# Patient Record
Sex: MALE | Race: WHITE | Employment: OTHER | URBAN - METROPOLITAN AREA
[De-identification: names, ages, dates, MRNs, and addresses within clinical notes are randomized per-mention and may not be internally consistent; named-entity substitution may affect disease eponyms.]

---

## 2022-03-18 PROBLEM — M54.17 LUMBOSACRAL NEURITIS: Status: ACTIVE | Noted: 2020-02-22

## 2022-03-19 PROBLEM — M51.379 DEGENERATION OF LUMBAR OR LUMBOSACRAL INTERVERTEBRAL DISC: Status: ACTIVE | Noted: 2020-02-22

## 2022-03-19 PROBLEM — M51.37 DEGENERATION OF LUMBAR OR LUMBOSACRAL INTERVERTEBRAL DISC: Status: ACTIVE | Noted: 2020-02-22

## 2022-03-20 PROBLEM — M43.19 SPONDYLOLISTHESIS OF MULTIPLE SITES IN SPINE: Status: ACTIVE | Noted: 2020-02-22

## 2022-05-23 ENCOUNTER — OFFICE VISIT (OUTPATIENT)
Dept: ORTHOPEDIC SURGERY | Age: 72
End: 2022-05-23
Payer: MEDICARE

## 2022-05-23 VITALS — HEIGHT: 68 IN | BODY MASS INDEX: 31.07 KG/M2 | WEIGHT: 205 LBS

## 2022-05-23 DIAGNOSIS — M54.16 LUMBAR RADICULOPATHY: Primary | ICD-10-CM

## 2022-05-23 DIAGNOSIS — M43.10 DEGENERATIVE SPONDYLOLISTHESIS: ICD-10-CM

## 2022-05-23 DIAGNOSIS — M48.062 SPINAL STENOSIS OF LUMBAR REGION WITH NEUROGENIC CLAUDICATION: ICD-10-CM

## 2022-05-23 DIAGNOSIS — M41.57 OTHER SECONDARY SCOLIOSIS, LUMBOSACRAL REGION: ICD-10-CM

## 2022-05-23 PROCEDURE — G8428 CUR MEDS NOT DOCUMENT: HCPCS | Performed by: ORTHOPAEDIC SURGERY

## 2022-05-23 PROCEDURE — G8417 CALC BMI ABV UP PARAM F/U: HCPCS | Performed by: ORTHOPAEDIC SURGERY

## 2022-05-23 PROCEDURE — 1036F TOBACCO NON-USER: CPT | Performed by: ORTHOPAEDIC SURGERY

## 2022-05-23 PROCEDURE — 1123F ACP DISCUSS/DSCN MKR DOCD: CPT | Performed by: ORTHOPAEDIC SURGERY

## 2022-05-23 PROCEDURE — 3017F COLORECTAL CA SCREEN DOC REV: CPT | Performed by: ORTHOPAEDIC SURGERY

## 2022-05-23 PROCEDURE — 99214 OFFICE O/P EST MOD 30 MIN: CPT | Performed by: ORTHOPAEDIC SURGERY

## 2022-05-23 NOTE — PROGRESS NOTES
10 MG tablet, Take one tablet by mouth 1 hour prior to procedure. (Patient not taking: Reported on 5/23/2022), Disp: , Rfl:     lansoprazole (PREVACID) 15 MG delayed release capsule, Take 15 mg by mouth every morning (before breakfast) (Patient not taking: Reported on 5/23/2022), Disp: , Rfl:     Allergies: Allergies   Allergen Reactions    Hydrocodone-Acetaminophen Nausea And Vomiting    Tramadol Nausea And Vomiting         Physical Exam:     This is a well developed well nourished male adult in no acute distress. Mood and affect are appropriate. Oriented to person, place, and time. Respirations are unlabored and there is no evidence of cyanosis    Chest is clear to auscultation. Heart is regular rate and rhythm. Inspection of the back reveals no evidence of rash, such as zoster. Examination of the lumbar spine reveals no significantly imbalanced sagittal or coronal plane deformity. Flexion is limited by pain more so than extension. There is not significant tenderness to palpation along the spinous processes or paraspinal musculature. The patient ambulates with a mildly antalgic gait and crouched posture. Straight leg testing is negative. There is minimal hip irritability with internal or external rotation bilaterally. Sensory testing reveals intact sensation to light touch and pin prick in the distribution of the L3-S1 dermatomes bilaterally, though there is subjective tingling over the buttocks and posterior thighs. .    Reflexes   Right Left   Quadriceps (L4) 2 2   Achilles (S1) 2 2     Ankle jerk is negative for clonus    Strength testing in the lower extremity reveals the following based on the 5 point grading scale:     HF (L2) H Ab (L5) KE (L3/4) ADF (L4) EHL (L5) A Ev (S1) APF (S1)   Right 5 5 5 5 5 5 5   Left 5 5 5 5 5 5 5     The feet are warm with good capillary refill and palpable pedal pulses.      Radiographic Studies:     X-rays including AP and lateral views of the lumbar spine were reviewed and interpreted: There is noted to be a L4-L5 spondylolisthesis. Also, there are multiple spondylotic changes noted including loss of disk height and osteophytes. No destructive lesion. Bone quality appears normal.   There is also noted to be scoliosis emanating from the L4-L5 and L5-S1 asymmetrical disc collapse. Radiographic Impression:    Lumbar spondylolisthesis    MRI of the lumbar spine images were reviewed and interpreted: Post laminectomy changes are noted at L4-L5. There are gaping facets. Also, there is noted to be lateral recess stenosis and asymmetrical loss of disc height at L5-S1. Diagnosis:      ICD-10-CM    1. Lumbar radiculopathy  M54.16    2. Degenerative spondylolisthesis  M43.10    3. Spinal stenosis of lumbar region with neurogenic claudication  M48.062    4. Other secondary scoliosis, lumbosacral region  M41.57        Assessment/Plan: This patient's clinical history and physical exam is consistent with neurogenic claudication which is likely due to lumbar stenosis with spondylolisthesis. I discussed the natural history of lumbar stenosis in that it is a degenerative condition that is usually slowly progressive resulting in gradual loss of mobility. I reassured the patient that this is not a condition that typically predisposes them to an acute paraplegia; however, the more neurologic deficits they acquire and the longer they go untreated, the less likely they are to have neurological improvements after an operation. They understand that conservative treatments typically include physical therapy, oral and/or epidural steroids, pain management, and simple observation. And, that these treatments do not address the anatomical pinching of the spinal nerves, but rather help patients cope with the resulting symptoms.   I also discussed potential surgical if the symptoms fail to improve or there is a progressive neurologic deficit or conservative management has been exhausted.    -Lumbar TLIF: We discussed the details of surgery including a midline incision in over the low back followed by dissection to the area of stenosis. The nerves would be freed up by trimming any impinging structures including ligaments and bone. Then any segments that are deemed to be unstable will be fused together with cadaver bone and screws and rods will supplement the fusion. A drain may be inserted and the wound would be closed with suture and covered with sterile dressings. The patient would expect to stay in the hospital 1-2 days or until he can get about safely with minimal assistance. A short stay in a rehabilitation facility could also be considered depending on how quickly he recovers. Follow-up would be scheduled for 2-3 weeks and he would have restrictions including no driving, and no lifting greater than 15 lbs until follow up with me. He was encouraged to walk as much as possible before and after the operation to facilitate an expeditious recovery. We also discussed the potential risks of the surgery including, but not limited to infection, spinal fluid leak and potential headaches requiring him to remain supine or have a lumbar drain inserted post-operatively; injury to the cauda equina or peripheral nerve root resulting in paralysis, bowel or bladder dysfunction, or loss of use of an extremity; persistent back or leg symptoms or pain at the bone aspirate/graft site; recurrence of stenosis or the development of instability, or failure of the hardware or fusion to heal possibly needing additional surgery;  blood loss requiring transfusion; and the risks of anesthesia including, but not limited heart attack, stroke, and blood clot. The patient voiced an understanding of these issues as outlined.   The procedure that may prove to be beneficial here is a L4-S1 laminectomy and fusion with allograft, and instrumentation, transforaminal lumbar interbody fusion.         Electronically Signed By Caitlin Salmeron MD     2:40 PM

## 2022-05-25 ENCOUNTER — TELEPHONE (OUTPATIENT)
Dept: ORTHOPEDIC SURGERY | Age: 72
End: 2022-05-25

## 2022-05-25 NOTE — TELEPHONE ENCOUNTER
Tried to call patient could not leave a voice mail. Pre op for surgery is usually one week prior to surgery.

## 2022-05-25 NOTE — TELEPHONE ENCOUNTER
Patient is scheduled for surgery 9/7 and they are trying to work out some plans and would like to know when the preop would be

## 2022-08-01 ENCOUNTER — TELEPHONE (OUTPATIENT)
Dept: ORTHOPEDIC SURGERY | Age: 72
End: 2022-08-01

## 2022-08-01 DIAGNOSIS — M54.16 LUMBAR RADICULOPATHY: Primary | ICD-10-CM

## 2022-08-01 DIAGNOSIS — M43.10 DEGENERATIVE SPONDYLOLISTHESIS: ICD-10-CM

## 2022-08-01 DIAGNOSIS — M48.062 SPINAL STENOSIS OF LUMBAR REGION WITH NEUROGENIC CLAUDICATION: ICD-10-CM

## 2022-08-01 NOTE — TELEPHONE ENCOUNTER
Spoke with patient and he would like to schedule another injection with Dr. Marquetta Burkitt. Appointment was scheduled today.

## 2022-08-02 ENCOUNTER — OFFICE VISIT (OUTPATIENT)
Dept: ORTHOPEDIC SURGERY | Age: 72
End: 2022-08-02
Payer: MEDICARE

## 2022-08-02 ENCOUNTER — PREP FOR PROCEDURE (OUTPATIENT)
Dept: ORTHOPEDIC SURGERY | Age: 72
End: 2022-08-02

## 2022-08-02 DIAGNOSIS — M48.062 SPINAL STENOSIS OF LUMBAR REGION WITH NEUROGENIC CLAUDICATION: ICD-10-CM

## 2022-08-02 DIAGNOSIS — M54.16 LUMBAR RADICULOPATHY: Primary | ICD-10-CM

## 2022-08-02 DIAGNOSIS — M43.10 DEGENERATIVE SPONDYLOLISTHESIS: ICD-10-CM

## 2022-08-02 DIAGNOSIS — M41.57 OTHER SECONDARY SCOLIOSIS, LUMBOSACRAL REGION: ICD-10-CM

## 2022-08-02 PROCEDURE — 64483 NJX AA&/STRD TFRM EPI L/S 1: CPT | Performed by: PHYSICAL MEDICINE & REHABILITATION

## 2022-08-02 RX ORDER — TRIAMCINOLONE ACETONIDE 40 MG/ML
120 INJECTION, SUSPENSION INTRA-ARTICULAR; INTRAMUSCULAR ONCE
Status: COMPLETED | OUTPATIENT
Start: 2022-08-02 | End: 2022-08-02

## 2022-08-02 RX ADMIN — TRIAMCINOLONE ACETONIDE 120 MG: 40 INJECTION, SUSPENSION INTRA-ARTICULAR; INTRAMUSCULAR at 16:34

## 2022-08-02 NOTE — PROGRESS NOTES
Name: Thelma Howard  YOB: 1950  Gender: male  MRN: [id number]        Transforaminal ZIGGY Procedure Note    Procedure: Bilateral  L4-L5 transforaminal epidural steroid injections     Precautions: Thelma Howard denies prior sensitivity to steroid, local anesthetic, iodine, or shellfish. Consent:  Consent was obtained prior to the procedure. The procedure was discussed at length with Thelma Howard. He was given the opportunity to ask questions regarding the procedure and its associated risks. In addition to the potential risks associated with the procedure itself, the patient was informed both verbally and in writing of potential side effects of the use glucocorticoids. The patient appeared to comprehend the informed consent and desired to have the procedure performed, and informed consent was signed. He was placed in a prone position on the fluoroscopy table and the skin was prepped and draped in a routine sterile fashion. The areas to be injected were each anesthetized with 1 ml of 1% Lidocaine. A 22 gauge 3.5 inch spinal needle was carefully advanced under fluoroscopic guidance to the right L4-L5 transforaminal space  0.5 ml of 70% of Omnipaque was injected to confirm proper needle placement and absence of subdural or vascular flow Once proper placement was confirmed, 0.5ml of 0.25 marcaine and 60 mg of kenalog were injected through the spinal needle. The above procedure was then repeated at the Left  L4-L5 transforaminal space using 60 mg of kenalog. Fluoroscopic guidance was used intermittently over a 10-minute period to insure proper needle placement and his safety. A hard copy of the fluoroscopic image has been placed in his chart and is saved on the C-arm hard drive. He was monitored for 30 minutes after the procedure and discharged home in a stable fashion with a routine follow up.     Procedural Diagnosis:     ICD-10-CM    1. Lumbar radiculopathy

## 2022-08-31 ENCOUNTER — HOSPITAL ENCOUNTER (OUTPATIENT)
Dept: PREADMISSION TESTING | Age: 72
Discharge: HOME | End: 2022-09-03
Payer: MEDICARE

## 2022-08-31 VITALS
RESPIRATION RATE: 16 BRPM | HEART RATE: 68 BPM | TEMPERATURE: 97.7 F | DIASTOLIC BLOOD PRESSURE: 74 MMHG | WEIGHT: 205 LBS | HEIGHT: 68 IN | BODY MASS INDEX: 31.07 KG/M2 | SYSTOLIC BLOOD PRESSURE: 130 MMHG | OXYGEN SATURATION: 97 %

## 2022-08-31 LAB
ANION GAP SERPL CALC-SCNC: 9 MMOL/L (ref 4–13)
APPEARANCE UR: CLEAR
BACTERIA SPEC CULT: NORMAL
BACTERIA URNS QL MICRO: NEGATIVE /HPF
BASOPHILS # BLD: 0 K/UL (ref 0–0.2)
BASOPHILS NFR BLD: 1 % (ref 0–2)
BILIRUB UR QL: NEGATIVE
BUN SERPL-MCNC: 23 MG/DL (ref 8–23)
CALCIUM SERPL-MCNC: 8.9 MG/DL (ref 8.3–10.4)
CASTS URNS QL MICRO: ABNORMAL /LPF
CHLORIDE SERPL-SCNC: 103 MMOL/L (ref 101–110)
CO2 SERPL-SCNC: 30 MMOL/L (ref 21–32)
COLOR UR: ABNORMAL
CREAT SERPL-MCNC: 1.16 MG/DL (ref 0.8–1.5)
DIFFERENTIAL METHOD BLD: ABNORMAL
EOSINOPHIL # BLD: 0.2 K/UL (ref 0–0.8)
EOSINOPHIL NFR BLD: 3 % (ref 0.5–7.8)
EPI CELLS #/AREA URNS HPF: ABNORMAL /HPF
ERYTHROCYTE [DISTWIDTH] IN BLOOD BY AUTOMATED COUNT: 13.3 % (ref 11.9–14.6)
GLUCOSE SERPL-MCNC: 87 MG/DL (ref 65–100)
GLUCOSE UR STRIP.AUTO-MCNC: NEGATIVE MG/DL
HCT VFR BLD AUTO: 34 % (ref 41.1–50.3)
HGB BLD-MCNC: 11.6 G/DL (ref 13.6–17.2)
HGB UR QL STRIP: NEGATIVE
IMM GRANULOCYTES # BLD AUTO: 0.1 K/UL (ref 0–0.5)
IMM GRANULOCYTES NFR BLD AUTO: 1 % (ref 0–5)
KETONES UR QL STRIP.AUTO: NEGATIVE MG/DL
LEUKOCYTE ESTERASE UR QL STRIP.AUTO: NEGATIVE
LYMPHOCYTES # BLD: 1.8 K/UL (ref 0.5–4.6)
LYMPHOCYTES NFR BLD: 29 % (ref 13–44)
MCH RBC QN AUTO: 32 PG (ref 26.1–32.9)
MCHC RBC AUTO-ENTMCNC: 34.1 G/DL (ref 31.4–35)
MCV RBC AUTO: 93.7 FL (ref 79.6–97.8)
MONOCYTES # BLD: 0.6 K/UL (ref 0.1–1.3)
MONOCYTES NFR BLD: 10 % (ref 4–12)
NEUTS SEG # BLD: 3.5 K/UL (ref 1.7–8.2)
NEUTS SEG NFR BLD: 57 % (ref 43–78)
NITRITE UR QL STRIP.AUTO: NEGATIVE
NRBC # BLD: 0 K/UL (ref 0–0.2)
PH UR STRIP: 7.5 [PH] (ref 5–9)
PLATELET # BLD AUTO: 178 K/UL (ref 150–450)
PMV BLD AUTO: 10.5 FL (ref 9.4–12.3)
POTASSIUM SERPL-SCNC: 3 MMOL/L (ref 3.5–5.1)
PROT UR STRIP-MCNC: ABNORMAL MG/DL
RBC # BLD AUTO: 3.63 M/UL (ref 4.23–5.6)
RBC #/AREA URNS HPF: ABNORMAL /HPF
SERVICE CMNT-IMP: NORMAL
SODIUM SERPL-SCNC: 142 MMOL/L (ref 136–145)
SP GR UR REFRACTOMETRY: 1.02 (ref 1–1.02)
UROBILINOGEN UR QL STRIP.AUTO: 0.2 EU/DL (ref 0.2–1)
WBC # BLD AUTO: 6.2 K/UL (ref 4.3–11.1)
WBC URNS QL MICRO: ABNORMAL /HPF

## 2022-08-31 PROCEDURE — 87641 MR-STAPH DNA AMP PROBE: CPT

## 2022-08-31 PROCEDURE — 80048 BASIC METABOLIC PNL TOTAL CA: CPT

## 2022-08-31 PROCEDURE — 85025 COMPLETE CBC W/AUTO DIFF WBC: CPT

## 2022-08-31 PROCEDURE — 81001 URINALYSIS AUTO W/SCOPE: CPT

## 2022-08-31 RX ORDER — LOSARTAN POTASSIUM AND HYDROCHLOROTHIAZIDE 25; 100 MG/1; MG/1
1 TABLET ORAL DAILY
COMMUNITY

## 2022-08-31 RX ORDER — ATORVASTATIN CALCIUM 80 MG/1
80 TABLET, FILM COATED ORAL NIGHTLY
COMMUNITY

## 2022-08-31 RX ORDER — CHLORAL HYDRATE 500 MG
1000 CAPSULE ORAL DAILY
COMMUNITY

## 2022-08-31 NOTE — PERIOP NOTE
Patient verified name, , and surgery as listed in [facility]. Patient provided medical/health information and PTA medications to the best of their ability. TYPE  CASE: 3  Orders per surgeon: Orders not received  Labs per surgeon: CBC with diff, BMP, UA, MRSA/MSSA. Results: pending. Labs per anesthesia protocol: T/S DOS  EKG:  NA    Pt instructed to hold Aspirin- pt denies any heart stents, MI, CVA,  or DVT. MRSA/MSSA swab collected; pharmacy to review and dose antibiotic as appropriate. Patient provided with and instructed on education handouts including Guide to Surgery, blood transfusions, pain management, and hand hygiene for the family and community, and [facility] [place]. Road to Recovery Spine surgery patient guide given. Instructed on incentive spirometry with return demonstration. Patient viewed spine prehab video. PASTORA-HEX and instructions given per hospital policy. Pt instructed to apply  [facility]- HEX to small portion of skin, like hand to determine allergic reaction. Original medication prescription bottles were visualized during patient appointment. Patient teach back successful and patient demonstrates knowledge of instruction.

## 2022-08-31 NOTE — PERIOP NOTE
PLEASE CONTINUE TAKING ALL PRESCRIPTION MEDICATIONS UP TO THE DAY OF SURGERY UNLESS OTHERWISE DIRECTED BELOW. DISCONTINUE all vitamins and supplements 7 days prior to surgery. DISCONTINUE Non-Steriodal Anti-Inflammatory (NSAIDS) such as Advil and Aleve 5 days prior to surgery. Home Medications to take  the day of surgery     Amlodipine (Norvasc)     Omeprazole (Prilosec)     Sertraline (Zoloft)     Home Medications   to Hold   No Vitamins, Supplements, or Anti- Inflammatories such as Aleve, Ibuprofen, Excedrin, Motrin, or Advil. Aspirin 81mg  Diclofenac (Voltaren)  Fish Oil  Multivitamin     Comments       On the day before surgery please take Acetaminophen 1000mg in the morning and then again before bed. You may substitute for Tylenol 650 mg.      Bring CPAP machine the day of surgery. Please do not bring home medications with you on the day of surgery unless otherwise directed by your nurse. If you are instructed to bring home medications, please give them to your nurse as they will be administered by the nursing staff. If you have any questions, please call [facility] [phone] or [address] [place] [address] [phone]. A copy of this note was provided to the patient for reference. How to Use Your Incentive Spirometer       About Your Incentive Spirometer  An incentive spirometer is a device that will expand your lungs by helping you to breathe more deeply and fully. The parts of your incentive spirometer are labeled in Figure 1. Using your incentive spirometer  When you're using your incentive spirometer, make sure to breathe through your mouth. If you breathe through your nose, the incentive spirometer won't work properly. You can hold your nose if you have trouble. DO NOT BLOW INTO THE DEVICE. If you feel dizzy at any time, stop and rest. Try again at a later time. Sit upright in a chair or in bed. Hold the incentive spirometer at eye level.    Put the mouthpiece in your mouth and close your lips tightly around it. Slowly breathe out (exhale) completely. Breathe in (inhale) slowly through your mouth as deeply as you can. As you take the breath, you will see the piston rise inside the large column. While the piston rises, the indicator on the right should move upwards. It should stay in between the 2 arrows (see Figure 1). Try to get the piston as high as you can, while keeping the indicator between the arrows. If the indicator doesn't stay between the arrows, you're breathing either too fast or too slow. When you get it as high as you can, hold your breath for 10 seconds, or as long as possible. While you're holding your breath, the piston will slowly fall to the base of the spirometer. Once the piston reaches the bottom of the spirometer, breathe out slowly through your mouth. Rest for a few seconds. Repeat 10 times. Try to get the piston to the same level with each breath. After each set of 10 breaths, try to cough as coughing will help loosen or clear any mucus in your lungs. Put the marker at the level the piston reached on your incentive spirometer. This will be your goal next time. Repeat these steps every hour that you're awake.   Cover the mouthpiece of the incentive spirometer when you aren't using it

## 2022-08-31 NOTE — PERIOP NOTE
Latest Reference Range & Units 8/31/22 10:30   Sodium 136 - 145 mmol/L 142   Potassium 3.5 - 5.1 mmol/L 3.0 (L)   Chloride 101 - 110 mmol/L 103   CO2 21 - 32 mmol/L 30   BUN,BUNPL 8 - 23 MG/DL 23   Creatinine 0.8 - 1.5 MG/DL 1.16   Anion Gap 4 - 13 mmol/L 9   GFR Non-African American >60 ml/min/1.73m2 >60   GFR  >60 ml/min/1.73m2 >60   Glucose, Random 65 - 100 mg/dL 87   CALCIUM, SERUM, 675440 8.3 - 10.4 MG/DL 8.9   WBC 4.3 - 11.1 K/uL 6.2   RBC 4.23 - 5.6 M/uL 3.63 (L)   Hemoglobin Quant 13.6 - 17.2 g/dL 11.6 (L)   Hematocrit 41.1 - 50.3 % 34.0 (L)   MCV 79.6 - 97.8 FL 93.7   MCH 26.1 - 32.9 PG 32.0   MCHC 31.4 - 35.0 g/dL 34.1   MPV 9.4 - 12.3 FL 10.5   RDW 11.9 - 14.6 % 13.3   Platelet Count 410 - 450 K/uL 178   Absolute Mono # 0.1 - 1.3 K/UL 0.6   Eosinophils % 0.5 - 7.8 % 3   Basophils Absolute 0.0 - 0.2 K/UL 0.0   Differential Type -   AUTOMATED   Seg Neutrophils 43 - 78 % 57   Segs Absolute 1.7 - 8.2 K/UL 3.5   Lymphocytes 13 - 44 % 29   Absolute Lymph # 0.5 - 4.6 K/UL 1.8   Monocytes 4.0 - 12.0 % 10   Absolute Eos # 0.0 - 0.8 K/UL 0.2   Basophils 0.0 - 2.0 % 1   Immature Granulocytes 0.0 - 5.0 % 1   Nucleated Red Blood Cells 0.0 - 0.2 K/uL 0.00   Absolute Immature Granulocyte 0.0 - 0.5 K/UL 0.1   Color, UA -   YELLOW/STRAW   Glucose, UA mg/dL Negative   Bilirubin, Urine NEG   Negative   Ketones, Urine NEG mg/dL Negative   Specific Gravity, UA 1.001 - 1.023   1.021   Blood, Urine NEG   Negative   Protein, UA NEG mg/dL TRACE !    Urobilinogen, Urine 0.2 - 1.0 EU/dL 0.2   Nitrite, Urine NEG   Negative   Leukocyte Esterase, Urine NEG   Negative   Appearance -   CLEAR   pH, Urine 5.0 - 9.0   7.5   Casts 0 /lpf 0-2   WBC, UA 0 /hpf 0-4   RBC, UA 0 /hpf 0-5   Epithelial Cells, UA 0 /hpf 0-5   Bacteria, UA 0 /hpf Negative   MSSA/MRSA SCREEN BY PCR  Rpt   Special Requests -   NO SPECIAL REQUESTS   (L): Data is abnormally low  !: Data is abnormal  Rpt: View report in Results Review for more information

## 2022-09-01 ENCOUNTER — TELEPHONE (OUTPATIENT)
Dept: ORTHOPEDIC SURGERY | Age: 72
End: 2022-09-01

## 2022-09-01 NOTE — TELEPHONE ENCOUNTER
He is having surgery next week. He has the paperwork for a handicap sticker. He needs CDV signature. Can he come by the office today and you get this for him so he can take it to the [facility]. Please let him know a time if okay.

## 2022-09-06 ENCOUNTER — ANESTHESIA EVENT (OUTPATIENT)
Dept: SURGERY | Age: 72
DRG: 455 | End: 2022-09-06
Payer: MEDICARE

## 2022-09-06 NOTE — PERIOP NOTE
Directly informed patient of pre op arrival time 0 on 9/7. All questions answered. Pre op instructions reviewed. Left contact information for any additional questions or needs.

## 2022-09-07 ENCOUNTER — APPOINTMENT (OUTPATIENT)
Dept: GENERAL RADIOLOGY | Age: 72
DRG: 455 | End: 2022-09-07
Attending: ORTHOPAEDIC SURGERY
Payer: MEDICARE

## 2022-09-07 ENCOUNTER — ANESTHESIA (OUTPATIENT)
Dept: SURGERY | Age: 72
DRG: 455 | End: 2022-09-07
Payer: MEDICARE

## 2022-09-07 ENCOUNTER — HOSPITAL ENCOUNTER (INPATIENT)
Age: 72
LOS: 2 days | Discharge: HOME HEALTH | DRG: 455 | End: 2022-09-09
Attending: ORTHOPAEDIC SURGERY | Admitting: ORTHOPAEDIC SURGERY
Payer: MEDICARE

## 2022-09-07 DIAGNOSIS — M41.57 OTHER SECONDARY SCOLIOSIS, LUMBOSACRAL REGION: ICD-10-CM

## 2022-09-07 DIAGNOSIS — M54.16 LUMBAR RADICULOPATHY: ICD-10-CM

## 2022-09-07 DIAGNOSIS — M43.19 SPONDYLOLISTHESIS OF MULTIPLE SITES IN SPINE: Primary | ICD-10-CM

## 2022-09-07 PROBLEM — Z98.1 S/P LUMBAR FUSION: Status: ACTIVE | Noted: 2022-09-07

## 2022-09-07 LAB
ABO + RH BLD: NORMAL
BLOOD GROUP ANTIBODIES SERPL: NORMAL
EKG ATRIAL RATE: 80 BPM
EKG DIAGNOSIS: NORMAL
EKG P AXIS: 54 DEGREES
EKG P-R INTERVAL: 206 MS
EKG Q-T INTERVAL: 418 MS
EKG QRS DURATION: 130 MS
EKG QTC CALCULATION (BAZETT): 482 MS
EKG R AXIS: -30 DEGREES
EKG T AXIS: 34 DEGREES
EKG VENTRICULAR RATE: 80 BPM
POTASSIUM BLD-SCNC: 2.9 MMOL/L (ref 3.5–5.1)
SPECIMEN EXP DATE BLD: NORMAL

## 2022-09-07 PROCEDURE — 63047 LAM FACETEC & FORAMOT LUMBAR: CPT | Performed by: ORTHOPAEDIC SURGERY

## 2022-09-07 PROCEDURE — 22634 ARTHRD CMBN 1NTRSPC EA ADDL: CPT | Performed by: ORTHOPAEDIC SURGERY

## 2022-09-07 PROCEDURE — C1889 IMPLANT/INSERT DEVICE, NOC: HCPCS | Performed by: ORTHOPAEDIC SURGERY

## 2022-09-07 PROCEDURE — 6360000002 HC RX W HCPCS: Performed by: ORTHOPAEDIC SURGERY

## 2022-09-07 PROCEDURE — 93005 ELECTROCARDIOGRAM TRACING: CPT | Performed by: ANESTHESIOLOGY

## 2022-09-07 PROCEDURE — C1713 ANCHOR/SCREW BN/BN,TIS/BN: HCPCS | Performed by: ORTHOPAEDIC SURGERY

## 2022-09-07 PROCEDURE — 63053 LAM FACTC/FRMT ARTHRD LUM EA: CPT | Performed by: ORTHOPAEDIC SURGERY

## 2022-09-07 PROCEDURE — 22853 INSJ BIOMECHANICAL DEVICE: CPT | Performed by: ORTHOPAEDIC SURGERY

## 2022-09-07 PROCEDURE — 3600000014 HC SURGERY LEVEL 4 ADDTL 15MIN: Performed by: ORTHOPAEDIC SURGERY

## 2022-09-07 PROCEDURE — 2500000003 HC RX 250 WO HCPCS: Performed by: ORTHOPAEDIC SURGERY

## 2022-09-07 PROCEDURE — 7100000001 HC PACU RECOVERY - ADDTL 15 MIN: Performed by: ORTHOPAEDIC SURGERY

## 2022-09-07 PROCEDURE — 22633 ARTHRD CMBN 1NTRSPC LUMBAR: CPT | Performed by: ORTHOPAEDIC SURGERY

## 2022-09-07 PROCEDURE — 0SG00K1 FUSION OF LUMBAR VERTEBRAL JOINT WITH NONAUTOLOGOUS TISSUE SUBSTITUTE, POSTERIOR APPROACH, POSTERIOR COLUMN, OPEN APPROACH: ICD-10-PCS | Performed by: ORTHOPAEDIC SURGERY

## 2022-09-07 PROCEDURE — 0SG00AJ FUSION OF LUMBAR VERTEBRAL JOINT WITH INTERBODY FUSION DEVICE, POSTERIOR APPROACH, ANTERIOR COLUMN, OPEN APPROACH: ICD-10-PCS | Performed by: ORTHOPAEDIC SURGERY

## 2022-09-07 PROCEDURE — 0SG30K1 FUSION OF LUMBOSACRAL JOINT WITH NONAUTOLOGOUS TISSUE SUBSTITUTE, POSTERIOR APPROACH, POSTERIOR COLUMN, OPEN APPROACH: ICD-10-PCS | Performed by: ORTHOPAEDIC SURGERY

## 2022-09-07 PROCEDURE — 2500000003 HC RX 250 WO HCPCS: Performed by: NURSE ANESTHETIST, CERTIFIED REGISTERED

## 2022-09-07 PROCEDURE — 6360000002 HC RX W HCPCS: Performed by: NURSE ANESTHETIST, CERTIFIED REGISTERED

## 2022-09-07 PROCEDURE — 2720000010 HC SURG SUPPLY STERILE: Performed by: ORTHOPAEDIC SURGERY

## 2022-09-07 PROCEDURE — 3700000001 HC ADD 15 MINUTES (ANESTHESIA): Performed by: ORTHOPAEDIC SURGERY

## 2022-09-07 PROCEDURE — 72100 X-RAY EXAM L-S SPINE 2/3 VWS: CPT

## 2022-09-07 PROCEDURE — 6370000000 HC RX 637 (ALT 250 FOR IP): Performed by: ORTHOPAEDIC SURGERY

## 2022-09-07 PROCEDURE — 3700000000 HC ANESTHESIA ATTENDED CARE: Performed by: ORTHOPAEDIC SURGERY

## 2022-09-07 PROCEDURE — 20930 SP BONE ALGRFT MORSEL ADD-ON: CPT | Performed by: ORTHOPAEDIC SURGERY

## 2022-09-07 PROCEDURE — 84132 ASSAY OF SERUM POTASSIUM: CPT

## 2022-09-07 PROCEDURE — 6360000002 HC RX W HCPCS: Performed by: ANESTHESIOLOGY

## 2022-09-07 PROCEDURE — 01NB0ZZ RELEASE LUMBAR NERVE, OPEN APPROACH: ICD-10-PCS | Performed by: ORTHOPAEDIC SURGERY

## 2022-09-07 PROCEDURE — 2700000000 HC OXYGEN THERAPY PER DAY

## 2022-09-07 PROCEDURE — 2709999900 HC NON-CHARGEABLE SUPPLY: Performed by: ORTHOPAEDIC SURGERY

## 2022-09-07 PROCEDURE — 2580000003 HC RX 258: Performed by: ANESTHESIOLOGY

## 2022-09-07 PROCEDURE — 2580000003 HC RX 258: Performed by: ORTHOPAEDIC SURGERY

## 2022-09-07 PROCEDURE — 0SG30AJ FUSION OF LUMBOSACRAL JOINT WITH INTERBODY FUSION DEVICE, POSTERIOR APPROACH, ANTERIOR COLUMN, OPEN APPROACH: ICD-10-PCS | Performed by: ORTHOPAEDIC SURGERY

## 2022-09-07 PROCEDURE — 01NR0ZZ RELEASE SACRAL NERVE, OPEN APPROACH: ICD-10-PCS | Performed by: ORTHOPAEDIC SURGERY

## 2022-09-07 PROCEDURE — 86901 BLOOD TYPING SEROLOGIC RH(D): CPT

## 2022-09-07 PROCEDURE — 97530 THERAPEUTIC ACTIVITIES: CPT

## 2022-09-07 PROCEDURE — 0ST40ZZ RESECTION OF LUMBOSACRAL DISC, OPEN APPROACH: ICD-10-PCS | Performed by: ORTHOPAEDIC SURGERY

## 2022-09-07 PROCEDURE — 0ST20ZZ RESECTION OF LUMBAR VERTEBRAL DISC, OPEN APPROACH: ICD-10-PCS | Performed by: ORTHOPAEDIC SURGERY

## 2022-09-07 PROCEDURE — 7100000000 HC PACU RECOVERY - FIRST 15 MIN: Performed by: ORTHOPAEDIC SURGERY

## 2022-09-07 PROCEDURE — 2580000003 HC RX 258: Performed by: NURSE ANESTHETIST, CERTIFIED REGISTERED

## 2022-09-07 PROCEDURE — A4217 STERILE WATER/SALINE, 500 ML: HCPCS | Performed by: ORTHOPAEDIC SURGERY

## 2022-09-07 PROCEDURE — 97161 PT EVAL LOW COMPLEX 20 MIN: CPT

## 2022-09-07 PROCEDURE — 22842 INSERT SPINE FIXATION DEVICE: CPT | Performed by: ORTHOPAEDIC SURGERY

## 2022-09-07 PROCEDURE — 1100000000 HC RM PRIVATE

## 2022-09-07 PROCEDURE — 3600000004 HC SURGERY LEVEL 4 BASE: Performed by: ORTHOPAEDIC SURGERY

## 2022-09-07 PROCEDURE — 63052 LAM FACETC/FRMT ARTHRD LUM 1: CPT | Performed by: ORTHOPAEDIC SURGERY

## 2022-09-07 DEVICE — POLYAXIAL CORTICAL SCREW
Type: IMPLANTABLE DEVICE | Site: VERTEBRAE | Status: FUNCTIONAL
Brand: XIA 4.5 SYSTEM -  XIA CT

## 2022-09-07 DEVICE — GRAFT BNE XL: Type: IMPLANTABLE DEVICE | Site: VERTEBRAE | Status: FUNCTIONAL

## 2022-09-07 DEVICE — BIO DBM PLUS PUTTY (WITH CANCELLOUS)
Type: IMPLANTABLE DEVICE | Site: VERTEBRAE | Status: FUNCTIONAL
Brand: BIO DBM

## 2022-09-07 DEVICE — ALLOGRAFT BNE CHIP 1-4 MM 15 CC CRUSH CANC: Type: IMPLANTABLE DEVICE | Site: VERTEBRAE | Status: FUNCTIONAL

## 2022-09-07 DEVICE — SPACER SPNL 15 DEG SM 28X10 MM STRL PROLIFT: Type: IMPLANTABLE DEVICE | Site: VERTEBRAE | Status: FUNCTIONAL

## 2022-09-07 DEVICE — BLOCKER
Type: IMPLANTABLE DEVICE | Site: VERTEBRAE | Status: FUNCTIONAL
Brand: XIA 4.5 SYSTEM -  XIA CT

## 2022-09-07 DEVICE — VITALLIUM PREBENT AND PRECUT ROD WITH HEX
Type: IMPLANTABLE DEVICE | Site: VERTEBRAE | Status: FUNCTIONAL
Brand: XIA 4 5

## 2022-09-07 RX ORDER — SUCCINYLCHOLINE CHLORIDE 20 MG/ML
INJECTION INTRAMUSCULAR; INTRAVENOUS PRN
Status: DISCONTINUED | OUTPATIENT
Start: 2022-09-07 | End: 2022-09-07 | Stop reason: SDUPTHER

## 2022-09-07 RX ORDER — DEXAMETHASONE SODIUM PHOSPHATE 4 MG/ML
INJECTION, SOLUTION INTRA-ARTICULAR; INTRALESIONAL; INTRAMUSCULAR; INTRAVENOUS; SOFT TISSUE PRN
Status: DISCONTINUED | OUTPATIENT
Start: 2022-09-07 | End: 2022-09-07 | Stop reason: SDUPTHER

## 2022-09-07 RX ORDER — POTASSIUM CHLORIDE 14.9 MG/ML
INJECTION INTRAVENOUS PRN
Status: DISCONTINUED | OUTPATIENT
Start: 2022-09-07 | End: 2022-09-07 | Stop reason: SDUPTHER

## 2022-09-07 RX ORDER — LIDOCAINE HYDROCHLORIDE 20 MG/ML
INJECTION, SOLUTION EPIDURAL; INFILTRATION; INTRACAUDAL; PERINEURAL PRN
Status: DISCONTINUED | OUTPATIENT
Start: 2022-09-07 | End: 2022-09-07 | Stop reason: SDUPTHER

## 2022-09-07 RX ORDER — ONDANSETRON 2 MG/ML
4 INJECTION INTRAMUSCULAR; INTRAVENOUS EVERY 6 HOURS PRN
Status: DISCONTINUED | OUTPATIENT
Start: 2022-09-07 | End: 2022-09-09 | Stop reason: HOSPADM

## 2022-09-07 RX ORDER — HYDROCHLOROTHIAZIDE 25 MG/1
25 TABLET ORAL DAILY
Status: DISCONTINUED | OUTPATIENT
Start: 2022-09-07 | End: 2022-09-09 | Stop reason: HOSPADM

## 2022-09-07 RX ORDER — ONDANSETRON 2 MG/ML
INJECTION INTRAMUSCULAR; INTRAVENOUS PRN
Status: DISCONTINUED | OUTPATIENT
Start: 2022-09-07 | End: 2022-09-07 | Stop reason: SDUPTHER

## 2022-09-07 RX ORDER — LABETALOL HYDROCHLORIDE 5 MG/ML
10 INJECTION, SOLUTION INTRAVENOUS
Status: DISCONTINUED | OUTPATIENT
Start: 2022-09-07 | End: 2022-09-07 | Stop reason: HOSPADM

## 2022-09-07 RX ORDER — SODIUM CHLORIDE 9 MG/ML
INJECTION, SOLUTION INTRAVENOUS PRN
Status: DISCONTINUED | OUTPATIENT
Start: 2022-09-07 | End: 2022-09-07 | Stop reason: HOSPADM

## 2022-09-07 RX ORDER — MIDAZOLAM HYDROCHLORIDE 2 MG/2ML
2 INJECTION, SOLUTION INTRAMUSCULAR; INTRAVENOUS
Status: DISCONTINUED | OUTPATIENT
Start: 2022-09-07 | End: 2022-09-07 | Stop reason: HOSPADM

## 2022-09-07 RX ORDER — FENTANYL CITRATE 50 UG/ML
INJECTION, SOLUTION INTRAMUSCULAR; INTRAVENOUS PRN
Status: DISCONTINUED | OUTPATIENT
Start: 2022-09-07 | End: 2022-09-07 | Stop reason: SDUPTHER

## 2022-09-07 RX ORDER — PROCHLORPERAZINE EDISYLATE 5 MG/ML
5 INJECTION INTRAMUSCULAR; INTRAVENOUS
Status: DISCONTINUED | OUTPATIENT
Start: 2022-09-07 | End: 2022-09-07 | Stop reason: HOSPADM

## 2022-09-07 RX ORDER — ROCURONIUM BROMIDE 10 MG/ML
INJECTION, SOLUTION INTRAVENOUS PRN
Status: DISCONTINUED | OUTPATIENT
Start: 2022-09-07 | End: 2022-09-07 | Stop reason: SDUPTHER

## 2022-09-07 RX ORDER — HYDRALAZINE HYDROCHLORIDE 20 MG/ML
10 INJECTION INTRAMUSCULAR; INTRAVENOUS
Status: DISCONTINUED | OUTPATIENT
Start: 2022-09-07 | End: 2022-09-07 | Stop reason: HOSPADM

## 2022-09-07 RX ORDER — KETAMINE HYDROCHLORIDE 50 MG/ML
INJECTION, SOLUTION, CONCENTRATE INTRAMUSCULAR; INTRAVENOUS PRN
Status: DISCONTINUED | OUTPATIENT
Start: 2022-09-07 | End: 2022-09-07 | Stop reason: SDUPTHER

## 2022-09-07 RX ORDER — SODIUM CHLORIDE 0.9 % (FLUSH) 0.9 %
5-40 SYRINGE (ML) INJECTION PRN
Status: DISCONTINUED | OUTPATIENT
Start: 2022-09-07 | End: 2022-09-07 | Stop reason: HOSPADM

## 2022-09-07 RX ORDER — HALOPERIDOL 5 MG/ML
1 INJECTION INTRAMUSCULAR
Status: DISCONTINUED | OUTPATIENT
Start: 2022-09-07 | End: 2022-09-07 | Stop reason: HOSPADM

## 2022-09-07 RX ORDER — HYDROMORPHONE HYDROCHLORIDE 2 MG/ML
0.5 INJECTION, SOLUTION INTRAMUSCULAR; INTRAVENOUS; SUBCUTANEOUS EVERY 5 MIN PRN
Status: COMPLETED | OUTPATIENT
Start: 2022-09-07 | End: 2022-09-07

## 2022-09-07 RX ORDER — CYCLOBENZAPRINE HCL 5 MG
10 TABLET ORAL 3 TIMES DAILY PRN
Status: DISCONTINUED | OUTPATIENT
Start: 2022-09-07 | End: 2022-09-09 | Stop reason: HOSPADM

## 2022-09-07 RX ORDER — FENTANYL CITRATE 50 UG/ML
100 INJECTION, SOLUTION INTRAMUSCULAR; INTRAVENOUS
Status: DISCONTINUED | OUTPATIENT
Start: 2022-09-07 | End: 2022-09-07 | Stop reason: HOSPADM

## 2022-09-07 RX ORDER — SODIUM CHLORIDE 0.9 % (FLUSH) 0.9 %
5-40 SYRINGE (ML) INJECTION EVERY 12 HOURS SCHEDULED
Status: DISCONTINUED | OUTPATIENT
Start: 2022-09-07 | End: 2022-09-07 | Stop reason: HOSPADM

## 2022-09-07 RX ORDER — DIPHENHYDRAMINE HYDROCHLORIDE 50 MG/ML
25 INJECTION INTRAMUSCULAR; INTRAVENOUS EVERY 6 HOURS PRN
Status: DISCONTINUED | OUTPATIENT
Start: 2022-09-07 | End: 2022-09-09 | Stop reason: HOSPADM

## 2022-09-07 RX ORDER — SODIUM CHLORIDE 9 MG/ML
INJECTION, SOLUTION INTRAVENOUS CONTINUOUS
Status: ACTIVE | OUTPATIENT
Start: 2022-09-07 | End: 2022-09-08

## 2022-09-07 RX ORDER — EPHEDRINE SULFATE/0.9% NACL/PF 50 MG/5 ML
SYRINGE (ML) INTRAVENOUS PRN
Status: DISCONTINUED | OUTPATIENT
Start: 2022-09-07 | End: 2022-09-07 | Stop reason: SDUPTHER

## 2022-09-07 RX ORDER — LIDOCAINE HYDROCHLORIDE 10 MG/ML
1 INJECTION, SOLUTION INFILTRATION; PERINEURAL
Status: DISCONTINUED | OUTPATIENT
Start: 2022-09-07 | End: 2022-09-07 | Stop reason: HOSPADM

## 2022-09-07 RX ORDER — SODIUM CHLORIDE, SODIUM LACTATE, POTASSIUM CHLORIDE, CALCIUM CHLORIDE 600; 310; 30; 20 MG/100ML; MG/100ML; MG/100ML; MG/100ML
INJECTION, SOLUTION INTRAVENOUS CONTINUOUS PRN
Status: DISCONTINUED | OUTPATIENT
Start: 2022-09-07 | End: 2022-09-07 | Stop reason: SDUPTHER

## 2022-09-07 RX ORDER — SODIUM CHLORIDE 0.9 % (FLUSH) 0.9 %
5-40 SYRINGE (ML) INJECTION EVERY 12 HOURS SCHEDULED
Status: DISCONTINUED | OUTPATIENT
Start: 2022-09-07 | End: 2022-09-09 | Stop reason: HOSPADM

## 2022-09-07 RX ORDER — OXYCODONE HYDROCHLORIDE 5 MG/1
5 TABLET ORAL
Status: DISCONTINUED | OUTPATIENT
Start: 2022-09-07 | End: 2022-09-07 | Stop reason: HOSPADM

## 2022-09-07 RX ORDER — ACETAMINOPHEN 325 MG/1
650 TABLET ORAL EVERY 6 HOURS
Status: DISCONTINUED | OUTPATIENT
Start: 2022-09-07 | End: 2022-09-09 | Stop reason: HOSPADM

## 2022-09-07 RX ORDER — LOSARTAN POTASSIUM 50 MG/1
100 TABLET ORAL DAILY
Status: DISCONTINUED | OUTPATIENT
Start: 2022-09-07 | End: 2022-09-09 | Stop reason: HOSPADM

## 2022-09-07 RX ORDER — DIPHENHYDRAMINE HYDROCHLORIDE 50 MG/ML
12.5 INJECTION INTRAMUSCULAR; INTRAVENOUS
Status: DISCONTINUED | OUTPATIENT
Start: 2022-09-07 | End: 2022-09-07 | Stop reason: HOSPADM

## 2022-09-07 RX ORDER — PROMETHAZINE HYDROCHLORIDE 25 MG/1
12.5 TABLET ORAL EVERY 6 HOURS PRN
Status: DISCONTINUED | OUTPATIENT
Start: 2022-09-07 | End: 2022-09-08

## 2022-09-07 RX ORDER — OXYCODONE HYDROCHLORIDE AND ACETAMINOPHEN 5; 325 MG/1; MG/1
1 TABLET ORAL EVERY 4 HOURS PRN
Qty: 40 TABLET | Refills: 0 | Status: SHIPPED | OUTPATIENT
Start: 2022-09-07 | End: 2022-09-14

## 2022-09-07 RX ORDER — PROPOFOL 10 MG/ML
INJECTION, EMULSION INTRAVENOUS PRN
Status: DISCONTINUED | OUTPATIENT
Start: 2022-09-07 | End: 2022-09-07 | Stop reason: SDUPTHER

## 2022-09-07 RX ORDER — DIPHENHYDRAMINE HCL 25 MG
25 CAPSULE ORAL EVERY 6 HOURS PRN
Status: DISCONTINUED | OUTPATIENT
Start: 2022-09-07 | End: 2022-09-09 | Stop reason: HOSPADM

## 2022-09-07 RX ORDER — SODIUM CHLORIDE, SODIUM LACTATE, POTASSIUM CHLORIDE, CALCIUM CHLORIDE 600; 310; 30; 20 MG/100ML; MG/100ML; MG/100ML; MG/100ML
INJECTION, SOLUTION INTRAVENOUS CONTINUOUS
Status: DISCONTINUED | OUTPATIENT
Start: 2022-09-07 | End: 2022-09-07 | Stop reason: HOSPADM

## 2022-09-07 RX ORDER — OXYCODONE HYDROCHLORIDE 5 MG/1
10 TABLET ORAL EVERY 4 HOURS PRN
Status: DISCONTINUED | OUTPATIENT
Start: 2022-09-07 | End: 2022-09-09 | Stop reason: HOSPADM

## 2022-09-07 RX ORDER — SODIUM CHLORIDE 0.9 % (FLUSH) 0.9 %
5-40 SYRINGE (ML) INJECTION PRN
Status: DISCONTINUED | OUTPATIENT
Start: 2022-09-07 | End: 2022-09-09 | Stop reason: HOSPADM

## 2022-09-07 RX ORDER — OXYCODONE HYDROCHLORIDE 5 MG/1
5 TABLET ORAL EVERY 4 HOURS PRN
Status: DISCONTINUED | OUTPATIENT
Start: 2022-09-07 | End: 2022-09-09 | Stop reason: HOSPADM

## 2022-09-07 RX ORDER — MORPHINE SULFATE 4 MG/ML
4 INJECTION, SOLUTION INTRAMUSCULAR; INTRAVENOUS
Status: DISCONTINUED | OUTPATIENT
Start: 2022-09-07 | End: 2022-09-09 | Stop reason: HOSPADM

## 2022-09-07 RX ORDER — ATORVASTATIN CALCIUM 80 MG/1
80 TABLET, FILM COATED ORAL NIGHTLY
Status: DISCONTINUED | OUTPATIENT
Start: 2022-09-07 | End: 2022-09-09 | Stop reason: HOSPADM

## 2022-09-07 RX ORDER — SODIUM CHLORIDE 9 MG/ML
INJECTION, SOLUTION INTRAVENOUS PRN
Status: DISCONTINUED | OUTPATIENT
Start: 2022-09-07 | End: 2022-09-09 | Stop reason: HOSPADM

## 2022-09-07 RX ORDER — HYDROMORPHONE HYDROCHLORIDE 2 MG/ML
0.25 INJECTION, SOLUTION INTRAMUSCULAR; INTRAVENOUS; SUBCUTANEOUS EVERY 5 MIN PRN
Status: DISCONTINUED | OUTPATIENT
Start: 2022-09-07 | End: 2022-09-07 | Stop reason: HOSPADM

## 2022-09-07 RX ORDER — LOSARTAN POTASSIUM AND HYDROCHLOROTHIAZIDE 25; 100 MG/1; MG/1
1 TABLET ORAL DAILY
Status: DISCONTINUED | OUTPATIENT
Start: 2022-09-07 | End: 2022-09-07 | Stop reason: RX

## 2022-09-07 RX ORDER — MORPHINE SULFATE 2 MG/ML
2 INJECTION, SOLUTION INTRAMUSCULAR; INTRAVENOUS
Status: DISCONTINUED | OUTPATIENT
Start: 2022-09-07 | End: 2022-09-09 | Stop reason: HOSPADM

## 2022-09-07 RX ORDER — ONDANSETRON 4 MG/1
4 TABLET, ORALLY DISINTEGRATING ORAL 3 TIMES DAILY PRN
Qty: 21 TABLET | Refills: 0 | Status: SHIPPED | OUTPATIENT
Start: 2022-09-07

## 2022-09-07 RX ORDER — VANCOMYCIN HYDROCHLORIDE 1 G/20ML
INJECTION, POWDER, LYOPHILIZED, FOR SOLUTION INTRAVENOUS PRN
Status: DISCONTINUED | OUTPATIENT
Start: 2022-09-07 | End: 2022-09-07 | Stop reason: ALTCHOICE

## 2022-09-07 RX ORDER — FENTANYL CITRATE 50 UG/ML
25 INJECTION, SOLUTION INTRAMUSCULAR; INTRAVENOUS
Status: DISCONTINUED | OUTPATIENT
Start: 2022-09-07 | End: 2022-09-07 | Stop reason: HOSPADM

## 2022-09-07 RX ORDER — AMLODIPINE BESYLATE 5 MG/1
5 TABLET ORAL DAILY
Status: DISCONTINUED | OUTPATIENT
Start: 2022-09-08 | End: 2022-09-09 | Stop reason: HOSPADM

## 2022-09-07 RX ADMIN — PROPOFOL 50 MG: 10 INJECTION, EMULSION INTRAVENOUS at 10:10

## 2022-09-07 RX ADMIN — POTASSIUM CHLORIDE 20 MEQ: 200 INJECTION, SOLUTION INTRAVENOUS at 07:33

## 2022-09-07 RX ADMIN — DEXAMETHASONE SODIUM PHOSPHATE 4 MG: 4 INJECTION, SOLUTION INTRAMUSCULAR; INTRAVENOUS at 07:53

## 2022-09-07 RX ADMIN — ROCURONIUM BROMIDE 35 MG: 50 INJECTION, SOLUTION INTRAVENOUS at 07:34

## 2022-09-07 RX ADMIN — HYDROMORPHONE HYDROCHLORIDE 0.5 MG: 2 INJECTION, SOLUTION INTRAMUSCULAR; INTRAVENOUS; SUBCUTANEOUS at 11:53

## 2022-09-07 RX ADMIN — ATORVASTATIN CALCIUM 80 MG: 80 TABLET, FILM COATED ORAL at 21:13

## 2022-09-07 RX ADMIN — KETAMINE HYDROCHLORIDE 20 MG: 50 INJECTION, SOLUTION INTRAMUSCULAR; INTRAVENOUS at 08:25

## 2022-09-07 RX ADMIN — FENTANYL CITRATE 100 MCG: 50 INJECTION, SOLUTION INTRAMUSCULAR; INTRAVENOUS at 07:26

## 2022-09-07 RX ADMIN — ACETAMINOPHEN 650 MG: 325 TABLET ORAL at 14:57

## 2022-09-07 RX ADMIN — SODIUM CHLORIDE, SODIUM LACTATE, POTASSIUM CHLORIDE, AND CALCIUM CHLORIDE: 600; 310; 30; 20 INJECTION, SOLUTION INTRAVENOUS at 07:32

## 2022-09-07 RX ADMIN — HYDROMORPHONE HYDROCHLORIDE 0.5 MG: 2 INJECTION, SOLUTION INTRAMUSCULAR; INTRAVENOUS; SUBCUTANEOUS at 11:09

## 2022-09-07 RX ADMIN — Medication 3 AMPULE: at 06:23

## 2022-09-07 RX ADMIN — SODIUM CHLORIDE, SODIUM LACTATE, POTASSIUM CHLORIDE, AND CALCIUM CHLORIDE: 600; 310; 30; 20 INJECTION, SOLUTION INTRAVENOUS at 07:18

## 2022-09-07 RX ADMIN — LIDOCAINE HYDROCHLORIDE 100 MG: 20 INJECTION, SOLUTION EPIDURAL; INFILTRATION; INTRACAUDAL; PERINEURAL at 07:26

## 2022-09-07 RX ADMIN — ACETAMINOPHEN 650 MG: 325 TABLET ORAL at 21:13

## 2022-09-07 RX ADMIN — HYDROMORPHONE HYDROCHLORIDE 0.5 MG: 2 INJECTION, SOLUTION INTRAMUSCULAR; INTRAVENOUS; SUBCUTANEOUS at 13:36

## 2022-09-07 RX ADMIN — CEFAZOLIN SODIUM 2000 MG: 100 INJECTION, POWDER, LYOPHILIZED, FOR SOLUTION INTRAVENOUS at 16:20

## 2022-09-07 RX ADMIN — SODIUM CHLORIDE, PRESERVATIVE FREE 5 ML: 5 INJECTION INTRAVENOUS at 21:14

## 2022-09-07 RX ADMIN — OXYCODONE 5 MG: 5 TABLET ORAL at 15:16

## 2022-09-07 RX ADMIN — ONDANSETRON 4 MG: 2 INJECTION INTRAMUSCULAR; INTRAVENOUS at 09:57

## 2022-09-07 RX ADMIN — PHENOL 1 SPRAY: 1.5 LIQUID ORAL at 14:57

## 2022-09-07 RX ADMIN — Medication 10 MG: at 08:31

## 2022-09-07 RX ADMIN — Medication 10 MG: at 08:54

## 2022-09-07 RX ADMIN — Medication 200 MG: at 07:26

## 2022-09-07 RX ADMIN — FENTANYL CITRATE 50 MCG: 50 INJECTION, SOLUTION INTRAMUSCULAR; INTRAVENOUS at 07:54

## 2022-09-07 RX ADMIN — ROCURONIUM BROMIDE 10 MG: 50 INJECTION, SOLUTION INTRAVENOUS at 08:45

## 2022-09-07 RX ADMIN — PROPOFOL 150 MG: 10 INJECTION, EMULSION INTRAVENOUS at 07:26

## 2022-09-07 RX ADMIN — FENTANYL CITRATE 50 MCG: 50 INJECTION, SOLUTION INTRAMUSCULAR; INTRAVENOUS at 10:08

## 2022-09-07 RX ADMIN — OXYCODONE 5 MG: 5 TABLET ORAL at 18:56

## 2022-09-07 RX ADMIN — KETAMINE HYDROCHLORIDE 45 MG: 50 INJECTION, SOLUTION INTRAMUSCULAR; INTRAVENOUS at 07:26

## 2022-09-07 RX ADMIN — ROCURONIUM BROMIDE 5 MG: 50 INJECTION, SOLUTION INTRAVENOUS at 07:26

## 2022-09-07 RX ADMIN — HYDROMORPHONE HYDROCHLORIDE 0.5 MG: 2 INJECTION, SOLUTION INTRAMUSCULAR; INTRAVENOUS; SUBCUTANEOUS at 10:44

## 2022-09-07 RX ADMIN — KETAMINE HYDROCHLORIDE 15 MG: 50 INJECTION, SOLUTION INTRAMUSCULAR; INTRAVENOUS at 09:40

## 2022-09-07 RX ADMIN — SODIUM CHLORIDE: 9 INJECTION, SOLUTION INTRAVENOUS at 14:57

## 2022-09-07 RX ADMIN — ROCURONIUM BROMIDE 10 MG: 50 INJECTION, SOLUTION INTRAVENOUS at 08:07

## 2022-09-07 RX ADMIN — SODIUM CHLORIDE, SODIUM LACTATE, POTASSIUM CHLORIDE, AND CALCIUM CHLORIDE: 600; 310; 30; 20 INJECTION, SOLUTION INTRAVENOUS at 06:22

## 2022-09-07 RX ADMIN — Medication 10 MG: at 08:04

## 2022-09-07 RX ADMIN — Medication 1000 MG: at 09:08

## 2022-09-07 RX ADMIN — Medication 2 G: at 07:52

## 2022-09-07 RX ADMIN — SODIUM CHLORIDE, SODIUM LACTATE, POTASSIUM CHLORIDE, AND CALCIUM CHLORIDE: 600; 310; 30; 20 INJECTION, SOLUTION INTRAVENOUS at 08:30

## 2022-09-07 RX ADMIN — ROCURONIUM BROMIDE 10 MG: 50 INJECTION, SOLUTION INTRAVENOUS at 09:18

## 2022-09-07 RX ADMIN — ROCURONIUM BROMIDE 5 MG: 50 INJECTION, SOLUTION INTRAVENOUS at 09:49

## 2022-09-07 ASSESSMENT — PAIN SCALES - GENERAL
PAINLEVEL_OUTOF10: 4
PAINLEVEL_OUTOF10: 4
PAINLEVEL_OUTOF10: 7
PAINLEVEL_OUTOF10: 4
PAINLEVEL_OUTOF10: 0
PAINLEVEL_OUTOF10: 7
PAINLEVEL_OUTOF10: 2
PAINLEVEL_OUTOF10: 7
PAINLEVEL_OUTOF10: 7
PAINLEVEL_OUTOF10: 3

## 2022-09-07 ASSESSMENT — PAIN DESCRIPTION - PAIN TYPE
TYPE: ACUTE PAIN;SURGICAL PAIN
TYPE: ACUTE PAIN;SURGICAL PAIN

## 2022-09-07 ASSESSMENT — PAIN DESCRIPTION - ORIENTATION
ORIENTATION: LOWER;MID
ORIENTATION: LOWER
ORIENTATION: LOWER

## 2022-09-07 ASSESSMENT — PAIN DESCRIPTION - ONSET
ONSET: SUDDEN
ONSET: ON-GOING

## 2022-09-07 ASSESSMENT — PAIN DESCRIPTION - DESCRIPTORS
DESCRIPTORS: SORE
DESCRIPTORS: ACHING;SORE
DESCRIPTORS: ACHING

## 2022-09-07 ASSESSMENT — PAIN DESCRIPTION - LOCATION
LOCATION: BACK

## 2022-09-07 ASSESSMENT — PAIN - FUNCTIONAL ASSESSMENT: PAIN_FUNCTIONAL_ASSESSMENT: 0-10

## 2022-09-07 ASSESSMENT — PAIN DESCRIPTION - FREQUENCY
FREQUENCY: INTERMITTENT
FREQUENCY: INTERMITTENT

## 2022-09-07 NOTE — ANESTHESIA PROCEDURE NOTES
Airway  Date/Time: 9/7/2022 8:05 AM  Urgency: elective    Airway not difficult    General Information and Staff    Patient location during procedure: OR  Anesthesiologist: David Gracia MD  Resident/CRNA: LIBIA Martinez - CRNA  Performed: resident/CRNA     Indications and Patient Condition  Indications for airway management: anesthesia  Spontaneous Ventilation: absent  Sedation level: deep  Preoxygenated: yes  Patient position: sniffing  Mask difficulty assessment: vent by bag mask    Final Airway Details  Final airway type: endotracheal airway      Successful airway: ETT     Successful intubation technique: direct laryngoscopy  Blade: Taz  Blade size: #3  ETT size (mm): 7.0  Cormack-Lehane Classification: grade I - full view of glottis  Placement verified by: chest auscultation   Measured from: lips  ETT to lips (cm): 24  Number of attempts at approach: 1

## 2022-09-07 NOTE — PERIOP NOTE
TRANSFER - OUT REPORT:    Verbal report given to Jerica Soares  being transferred to 35 20 [id number] for routine post-op       Report consisted of patient's Situation, Background, Assessment and   Recommendations(SBAR). Information from the following report(s) Nurse Handoff Report, Surgery Report, Intake/Output, and Recent Results was reviewed with the receiving nurse. Lines:   Peripheral IV 09/07/22 Right;Posterior Hand (Active)   Site Assessment Clean, dry & intact 09/07/22 1059   Line Status Blood return noted; Infusing 09/07/22 0622   Phlebitis Assessment No symptoms 09/07/22 1059   Infiltration Assessment 0 09/07/22 1059   Dressing Status Clean, dry & intact 09/07/22 1029       Peripheral IV Left;Posterior Forearm (Active)   Site Assessment Clean, dry & intact 09/07/22 1059   Phlebitis Assessment No symptoms 09/07/22 1059   Infiltration Assessment 0 09/07/22 1059   Dressing Status Clean, dry & intact 09/07/22 1059   Dressing Type Transparent 09/07/22 1059        Opportunity for questions and clarification was provided.       Patient transported with:  O2 @ 2lpm Lindsay Gonzáles

## 2022-09-07 NOTE — ANESTHESIA PRE PROCEDURE
Department of Anesthesiology  Preprocedure Note       Name:  Amber Good   Age:  67 y.o.  :  1950                                          MRN:  [id number]         Date:  2022      Surgeon: Nguyen Azevedo):  Robert Guerrero MD    Procedure: Procedure(s):  L4-S1 laminectomy and fusion with allograft and instrumentation; transforaminal lumbar interbody fusion    Medications prior to admission:   Prior to Admission medications    Medication Sig Start Date End Date Taking? Authorizing Provider   atorvastatin (LIPITOR) 80 MG tablet Take 80 mg by mouth at bedtime    Historical Provider, MD   losartan-hydroCHLOROthiazide (HYZAAR) 100-25 MG per tablet Take 1 tablet by mouth daily    Historical Provider, MD   Omega-3 Fatty Acids (FISH OIL) 1000 MG CAPS Take 1,000 mg by mouth daily    Historical Provider, MD   Multiple Vitamins-Minerals (MENS MULTIVITAMIN PO) Take 1 tablet by mouth daily    Historical Provider, MD   sertraline (ZOLOFT) 50 MG tablet Take 50 mg by mouth daily    Historical Provider, MD   acetaminophen (TYLENOL) 650 MG extended release tablet Take 650 mg by mouth every 8 hours    Ar Automatic Reconciliation   amLODIPine (NORVASC) 5 MG tablet Take 5 mg by mouth daily    Ar Automatic Reconciliation   aspirin 81 MG EC tablet Take 81 mg by mouth daily    Ar Automatic Reconciliation   diclofenac (VOLTAREN) 75 MG EC tablet Take 75 mg by mouth daily    Ar Automatic Reconciliation   omeprazole (PRILOSEC) 20 MG delayed release capsule Take 20 mg by mouth daily    Ar Automatic Reconciliation       Current medications:    No current facility-administered medications for this encounter.      Current Outpatient Medications   Medication Sig Dispense Refill    atorvastatin (LIPITOR) 80 MG tablet Take 80 mg by mouth at bedtime      losartan-hydroCHLOROthiazide (HYZAAR) 100-25 MG per tablet Take 1 tablet by mouth daily      Omega-3 Fatty Acids (FISH OIL) 1000 MG CAPS Take 1,000 mg by mouth daily      Multiple Vitamins-Minerals (MENS MULTIVITAMIN PO) Take 1 tablet by mouth daily      sertraline (ZOLOFT) 50 MG tablet Take 50 mg by mouth daily      acetaminophen (TYLENOL) 650 MG extended release tablet Take 650 mg by mouth every 8 hours      amLODIPine (NORVASC) 5 MG tablet Take 5 mg by mouth daily      aspirin 81 MG EC tablet Take 81 mg by mouth daily      diclofenac (VOLTAREN) 75 MG EC tablet Take 75 mg by mouth daily      omeprazole (PRILOSEC) 20 MG delayed release capsule Take 20 mg by mouth daily         Allergies:     Allergies   Allergen Reactions    Hydrocodone-Acetaminophen Nausea And Vomiting    Tramadol Nausea And Vomiting       Problem List:    Patient Active Problem List   Diagnosis Code    Lumbosacral neuritis M54.17    Degeneration of lumbar or lumbosacral intervertebral disc M51.37    Spondylolisthesis of multiple sites in spine M43.19    Lumbar radiculopathy M54.16    Other secondary scoliosis, lumbosacral region M41.57       Past Medical History:        Diagnosis Date    Arthritis     osteoarthritis     Back pain     Chronic pain     lower back radiating down L leg     Former cigarette smoker     GERD (gastroesophageal reflux disease)     controlled with meds     Hypercholesteremia     Hypertension     controlled with meds     Lumbar radiculopathy     Nausea & vomiting     severe n/v after pain med admin postop     PONV (postoperative nausea and vomiting)     N/V post-op    PTSD (post-traumatic stress disorder)     managed with Zoloft    Sleep apnea with use of continuous positive airway pressure (CPAP)     complaint with CPAP       Past Surgical History:        Procedure Laterality Date    BACK SURGERY  2014    CARPAL TUNNEL RELEASE      COLONOSCOPY      KNEE ARTHROSCOPY Right     R knee arthroscopy with meniscectomy    ORTHOPEDIC SURGERY      Trigger Release R hand    NC ABDOMEN SURGERY PROC UNLISTED      umbilical hernia repair     ROTATOR CUFF REPAIR Right 2016  TONSILLECTOMY         Social History:    Social History     Tobacco Use    Smoking status: Former     Packs/day: 2.00     Years: 20.00     Pack years: 40.00     Types: Cigarettes     Quit date: 12/15/1992     Years since quittin.7    Smokeless tobacco: Never   Substance Use Topics    Alcohol use: Yes     Alcohol/week: 1.0 standard drink     Types: 1 Shots of liquor per week                                Counseling given: Not Answered      Vital Signs (Current): There were no vitals filed for this visit. BP Readings from Last 3 Encounters:   22 130/74       NPO Status:                                                                                 BMI:   Wt Readings from Last 3 Encounters:   22 205 lb (93 kg)   22 205 lb (93 kg)   08/10/21 210 lb (95.3 kg)     There is no height or weight on file to calculate BMI.    CBC:   Lab Results   Component Value Date/Time    WBC 6.2 2022 10:30 AM    RBC 3.63 2022 10:30 AM    HGB 11.6 2022 10:30 AM    HCT 34.0 2022 10:30 AM    MCV 93.7 2022 10:30 AM    RDW 13.3 2022 10:30 AM     2022 10:30 AM       CMP:   Lab Results   Component Value Date/Time     2022 10:30 AM    K 3.0 2022 10:30 AM     2022 10:30 AM    CO2 30 2022 10:30 AM    BUN 23 2022 10:30 AM    CREATININE 1.16 2022 10:30 AM    GFRAA >60 2022 10:30 AM    LABGLOM >60 2022 10:30 AM    GLUCOSE 87 2022 10:30 AM    CALCIUM 8.9 2022 10:30 AM       POC Tests: No results for input(s): POCGLU, POCNA, POCK, POCCL, POCBUN, POCHEMO, POCHCT in the last 72 hours.     Coags: No results found for: PROTIME, INR, APTT    HCG (If Applicable): No results found for: PREGTESTUR, PREGSERUM, HCG, HCGQUANT     ABGs: No results found for: PHART, PO2ART, JLS0MXA, YEO5AYH, BEART, F9QRFIEF     Type & Screen (If Applicable):  No results found for: LABABO, [address]    Drug/Infectious Status (If Applicable):  No results found for: HIV, HEPCAB    COVID-19 Screening (If Applicable): No results found for: COVID19        Anesthesia Evaluation  Patient summary reviewed and Nursing notes reviewed  Airway: Mallampati: II  TM distance: >3 FB   Neck ROM: full  Mouth opening: > = 3 FB   Dental:          Pulmonary: breath sounds clear to auscultation  (+) sleep apnea: on CPAP,                             Cardiovascular:  Exercise tolerance: good (>4 METS),   (+) hypertension:, hyperlipidemia        Rhythm: regular  Rate: normal                    Neuro/Psych:   (+) depression/anxiety             GI/Hepatic/Renal:   (+) GERD: well controlled,           Endo/Other: Negative Endo/Other ROS                    Abdominal:             Vascular: Other Findings:           Anesthesia Plan      general     ASA 2       Induction: intravenous. Anesthetic plan and risks discussed with patient and spouse.                         Olegario Barry MD   9/6/2022

## 2022-09-07 NOTE — H&P
Chief complaint: Back and buttock pain with activities. History of present illness: This is a very pleasant 67 y.o. old male who presents with a greater than 1 year history of low back pain with episodic radiation to the buttocks and lower extremities,  on the bilateral   side. The onset of the symptoms was rather insidious. The patient describes the quality of the pain as a deep ache. The patient has noticed a progressive decrease in his  ability to walk or stand for any extended period of time. His  walking and standing pain is usually relieved with sitting. He  has noted that pushing a cart in the store seems to help. He denies any change in bowel or bladder function since the onset of the symptoms. This patient has had lumbar surgery in the past including a left L4-L5 hemilaminectomy by Dr Sidra Marroquin. Thus far, the he has tried Home exercise program and numerous epidural injections which each helped for about 6 weeks. .        Medications:       Current Medication      Current Outpatient Medications:     acetaminophen (TYLENOL) 650 MG extended release tablet, Take 650 mg by mouth every 8 hours, Disp: , Rfl:     amLODIPine (NORVASC) 5 MG tablet, Take 5 mg by mouth daily, Disp: , Rfl:     aspirin 81 MG EC tablet, Take 81 mg by mouth daily, Disp: , Rfl:     atorvastatin (LIPITOR) 20 MG tablet, Take 20 mg by mouth every evening, Disp: , Rfl:     diclofenac (VOLTAREN) 75 MG EC tablet, Take by mouth, Disp: , Rfl:     hydroCHLOROthiazide (HYDRODIURIL) 25 MG tablet, Take 25 mg by mouth daily, Disp: , Rfl:     losartan (COZAAR) 50 MG tablet, Take by mouth daily, Disp: , Rfl:     omeprazole (PRILOSEC) 20 MG delayed release capsule, Take 20 mg by mouth daily, Disp: , Rfl:     ALPRAZolam (XANAX) 1 MG tablet, Take 1 tablet by mouth 1 hour prior to MRI (Patient not taking: Reported on 5/23/2022), Disp: , Rfl:     diazePAM (VALIUM) 10 MG tablet, Take one tablet by mouth 1 hour prior to procedure.  (Patient not taking: Reported on 5/23/2022), Disp: , Rfl:     lansoprazole (PREVACID) 15 MG delayed release capsule, Take 15 mg by mouth every morning (before breakfast) (Patient not taking: Reported on 5/23/2022), Disp: , Rfl:         Allergies: Allergies   Allergen Reactions    Hydrocodone-Acetaminophen Nausea And Vomiting    Tramadol Nausea And Vomiting            Physical Exam:      This is a well developed well nourished male adult in no acute distress. Mood and affect are appropriate. Oriented to person, place, and time. Respirations are unlabored and there is no evidence of cyanosis     Chest is clear to auscultation. Heart is regular rate and rhythm. Inspection of the back reveals no evidence of rash, such as zoster. Examination of the lumbar spine reveals no significantly imbalanced sagittal or coronal plane deformity. Flexion is limited by pain more so than extension. There is not significant tenderness to palpation along the spinous processes or paraspinal musculature. The patient ambulates with a mildly antalgic gait and crouched posture. Straight leg testing is negative. There is minimal hip irritability with internal or external rotation bilaterally. Sensory testing reveals intact sensation to light touch and pin prick in the distribution of the L3-S1 dermatomes bilaterally, though there is subjective tingling over the buttocks and posterior thighs. .     Reflexes    Right Left   Quadriceps (L4) 2 2   Achilles (S1) 2 2      Ankle jerk is negative for clonus     Strength testing in the lower extremity reveals the following based on the 5 point grading scale:       HF (L2) H Ab (L5) KE (L3/4) ADF (L4) EHL (L5) A Ev (S1) APF (S1)   Right 5 5 5 5 5 5 5   Left 5 5 5 5 5 5 5      The feet are warm with good capillary refill and palpable pedal pulses.      Radiographic Studies:      X-rays including AP and lateral views of the lumbar spine were reviewed and interpreted: There is noted to be a L4-L5 spondylolisthesis. Also, there are multiple spondylotic changes noted including loss of disk height and osteophytes. No destructive lesion. Bone quality appears normal.   There is also noted to be scoliosis emanating from the L4-L5 and L5-S1 asymmetrical disc collapse. Radiographic Impression:     Lumbar spondylolisthesis     MRI of the lumbar spine images were reviewed and interpreted: Post laminectomy changes are noted at L4-L5. There are gaping facets. Also, there is noted to be lateral recess stenosis and asymmetrical loss of disc height at L5-S1. Diagnosis:         ICD-10-CM     1. Lumbar radiculopathy  M54.16     2. Degenerative spondylolisthesis  M43.10     3. Spinal stenosis of lumbar region with neurogenic claudication  M48.062     4. Other secondary scoliosis, lumbosacral region  M41.57           Assessment/Plan: This patient's clinical history and physical exam is consistent with neurogenic claudication which is likely due to lumbar stenosis with spondylolisthesis. I discussed the natural history of lumbar stenosis in that it is a degenerative condition that is usually slowly progressive resulting in gradual loss of mobility. I reassured the patient that this is not a condition that typically predisposes them to an acute paraplegia; however, the more neurologic deficits they acquire and the longer they go untreated, the less likely they are to have neurological improvements after an operation. They understand that conservative treatments typically include physical therapy, oral and/or epidural steroids, pain management, and simple observation. And, that these treatments do not address the anatomical pinching of the spinal nerves, but rather help patients cope with the resulting symptoms. I also discussed potential surgical if the symptoms fail to improve or there is a progressive neurologic deficit or conservative management has been exhausted. -Lumbar TLIF: We discussed the details of surgery including a midline incision in over the low back followed by dissection to the area of stenosis. The nerves would be freed up by trimming any impinging structures including ligaments and bone. Then any segments that are deemed to be unstable will be fused together with cadaver bone and screws and rods will supplement the fusion. A drain may be inserted and the wound would be closed with suture and covered with sterile dressings. The patient would expect to stay in the hospital 1-2 days or until he can get about safely with minimal assistance. A short stay in a rehabilitation facility could also be considered depending on how quickly he recovers. Follow-up would be scheduled for 2-3 weeks and he would have restrictions including no driving, and no lifting greater than 15 lbs until follow up with me. He was encouraged to walk as much as possible before and after the operation to facilitate an expeditious recovery. We also discussed the potential risks of the surgery including, but not limited to infection, spinal fluid leak and potential headaches requiring him to remain supine or have a lumbar drain inserted post-operatively; injury to the cauda equina or peripheral nerve root resulting in paralysis, bowel or bladder dysfunction, or loss of use of an extremity; persistent back or leg symptoms or pain at the bone aspirate/graft site; recurrence of stenosis or the development of instability, or failure of the hardware or fusion to heal possibly needing additional surgery;  blood loss requiring transfusion; and the risks of anesthesia including, but not limited heart attack, stroke, and blood clot. The patient voiced an understanding of these issues as outlined. The procedure that may prove to be beneficial here is a L4-S1 laminectomy and fusion with allograft, and instrumentation, transforaminal lumbar interbody fusion.

## 2022-09-07 NOTE — OP NOTE
[address] [address]   [phone]    OPERATIVE REPORT    Patient ID:Logan Vega  [id number]  1950  67 y.o. DATE OF SURGERY: 9/7/2022    SURGEON: Lissette Downing MD    PREOP DIAGNOSIS:     1. Lumbar spondylolisthesis   2. Lumbar stenosis     POSTOP DIAGNOSIS:     1. Lumbar spondylolisthesis   2. Lumbar stenosis     PROCEDURE:  1. Posterolateral and transforaminal lumbar interbody fusion L4-5 and L5-S1 including laminectomy at L4, L5, S1 for stenosis (CPT 54878, 83343, 79221, 63053 X 2)  2. Insertion biomechanical device L4-5 and L5-S1 (CPT 22853 X 2)  3. Lumbar laminectomy L3  with foraminotomies. (CPT I1703792)  4. Cortical screw instrumentation  L4 through S1 . (CPT V9627551)  5. Allograft (CPT 25689)     ANESTHESIA: General    ESTIMATED BLOOD LOSS:  750 ml    INTRAOPERATIVE COMPLICATIONS: None. POSTOP CONDITION: Stable. IMPLANTS:   Implant Name Type Inv.  Item Serial No.  Lot No. LRB No. Used Action   GRAFT BNE XL - [id number]  GRAFT BNE XL [id number] BIOLOGICA  N/A 1 Implanted   ALLOGRAFT BNE CHIP 1-4 MM 15 CC CRUSH CAN - [id number]  ALLOGRAFT BNE CHIP 1-4 MM 15 CC CRUSH CAN [id number] [employer] [facility]  N/A 1 Implanted   SERVICE FEE 10ML BIO DBM PTTY W CHIP - [id number]  SERVICE FEE 10ML BIO DBM PTTY W CHIP  [employer] [facility] [id number] N/A 1 Implanted   SERVICE FEE 10ML BIO DBM PTTY W CHIP - [id number]  SERVICE FEE 10ML BIO DBM PTTY W CHIP  [employer] [facility] [id number] N/A 1 Implanted   SPACER SPNL 15 DEG SM 28X10 MM STRL PROLIFT - [id number]  SPACER SPNL 15 DEG SM 28X10 MM STRL PROLIFT  [employer] [facility]  N/A 1 Implanted   SPACER SPNL 15 DEG SM 28X10 MM STRL PROLIFT - [id number]  SPACER SPNL 15 DEG SM 28X10 MM STRL PROLIFT  [employer] \[facility] [place] [facility]\""-WD  N/A 1 Implanted   BLOCKER SPNL CT CARMELA 45 - [id number]  BLOCKER SPNL CT CARMELA 45  [employer]INE HOW-WD [id number] N/A 6 Implanted SCREW SPNL L25MM OD55MM POLYAX JIAN CT CARMELA - [id number]  SCREW SPNL L25MM OD55MM POLYAX JIAN CT CARMELA  [employer] HOWM-WD [id number] N/A 1 Implanted   SCREW SPNL L30MM OD55MM POLYAX JIAN CT CARMELA - [id number]  SCREW SPNL L30MM OD55MM POLYAX JIAN CT CARMELA  [employer] HOW- [id number] N/A 1 Implanted   SCREW SPNL L45MM OD5MM POLYAX JIAN CT CARMELA - [id number]  SCREW SPNL L45MM OD5MM POLYAX JIAN CT CARMELA  [employer] HOWM-WD [id number] N/A 1 Implanted   SCREW SPNL L20MM OD55MM POLYAX JIAN CT CARMELA - [id number]  SCREW SPNL L20MM OD55MM POLYAX JIAN CT CARMELA  [employer] HOW-WD [id number] N/A 1 Implanted   SCREW SPNL L20MM OD65MM POLYAX JIAN CT CARMELA - [id number]  SCREW SPNL L20MM OD65MM POLYAX JIAN CT CARMELA  [employer] HOW- [id number] N/A 1 Implanted   LASHONDA SPINE PREBENT W HEX VITALIUM [id number] CARMELA - [id number]  LASH[employer] SPINE PREBENT W HEX VITALIUM [id number] CARMELA  [employer] HOW-WD [id number] N/A 2 Implanted   SCREW SPNL L30MM OD65MM POLYAX JIAN CT CARMELA - [id number]  SCREW SPNL L30MM OD65MM POLYAX JIAN CT CARMELA  [employer] HOW-WD [id number] N/A 1 Implanted       INDICATIONS FOR PROCEDURE: Patient has had low back pain with radiation to the buttocks and lower extremities for an extended period of time. The symptoms and exam findings were felt to be consistent with neurogenic claudication. The preoperative radiographs and other imaging confirmed showed spondylolisthesis and stenosis. Conservative measures have been exhausted as outlined in the H&P. The symptoms progressed to the point where there is difficulty performing any task that requires prolonged standing or walking which interfered with activities of daily living and ability to enjoy life. In the outpatient setting the risks, benefits and potential complications of the above-listed procedure were discussed with her and an informed consent was obtained.      DESCRIPTION OF PROCEDURE: After adequate induction of general anesthesia, the patient was positioned prone [facility]. Care was taken to pad all bony prominences. The shoulders and elbows were placed in the 90/90 position. The abdomen was allowed to hang free to decrease intraabdominal and venous pressure. The lumbar area was prepped and draped in the usual sterile fashion. Preoperative antibiotics were administered. A time out was called to confirm appropriate patient, proposed procedure and proposed incision site. With this confirmation, an incision was created in the midline of the back over the lumbar spinous processes. Dissection was carried down through the skin and subcutaneous tissues to the level of the lumbodorsal fascia. The lumbar dorsal fascia was released off of the spinous processes. The paraspinous musculature was elevated in a subperiosteal fashion in a lateral direction off of the lamina and over the the facet joints to be fused. A curet was slipped beneath the lamina and a cross table flouroscopic image was obtained to identify appropriate spinal level. The L4 through S1 transverse processes were exposed to their lateral tips. The sacral ala was exposed as well. All soft tissue was elevated off of the intertransverse membrane laterally in preparation for a fusion bed. With the posterior lateral dissection completed, attention was directed centrally where a Leksell rongeur was used to resect the spinous processes of L3 through S1. The 4 mm evelio was then used to thin the lamina to an egg shell like thickness. A triple-0 angled curet was used to elevate the ligamentum flavum off of its origin on the caudal surface of the L5 lamina as well as off the cephalad surface of the S1 lamina. The ligamentum flavum was elevated from the thecal sac and a plane was created in the epidural space with a [place] elevator. A 4 mm Kerrison was used to perform a central laminectomy of S1 and this was carried cephalad through L3.  The central laminectomy was widened to the medial border of the pedicle at each level. With the central laminectomy completed, a 4 mm Kerrison was used to undercut the lateral recess along the entire length of the laminectomy site. Then using the [facility] elevator to retract the thecal sac and identify each of the nerve roots, partial foraminotomies were performed and each nerve was visualized and decompressed bilaterally at L3, L4, L5, S1. The meryl nerve root retractor was used to retrace the thecal sac overlying the L4 - L5  disc space. Care was taken to protect the exiting and descending nerve roots at all times. An annulotomy was then performed with a 15-blade. A complete discectomy was performed using a series of curets and rongeurs. The interbody sizing system was brought to the field and the sizing paddles were used sequentially to an appropriate fit. The endplates were prepared for fusion using the rasps. A trial interbody device was sized and inserted. Fluoroscopic images were obtained of the trial in both planes. The final interbody implant was the opened and packed with local autograft. Additional local bone graft was placed anteriorly in the interbody space. The biomechanical device was then impacted and rotated appropriately. Again fluoroscopy was ws used to confirm cage positioning. The meryl nerve root retractor was used to retrace the thecal sac overlying the L5 - S1  disc space. Care was taken to protect the exiting and descending nerve roots at all times. An annulotomy was then performed with a 15-blade. A complete discectomy was performed using a series of curets and rongeurs. The interbody sizing system was brought to the field and the sizing paddles were used sequentially to an appropriate fit. The endplates were prepared for fusion using the rasps. A trial interbody device was sized and inserted. Fluoroscopic images were obtained of the trial in both planes. The final interbody implant was the opened and packed with local autograft. Additional local bone graft was placed anteriorly in the interbody space. The biomechanical device was then impacted and rotated appropriately. Again fluoroscopy was ws used to confirm cage positioning. The 4 mm evelio was used to decorticate the previously exposed transverse processes and lateral aspect of the facet joints and pars intra-articularis. The sacral ala was decorticated as well. The Sunday Kissousa spinal instrumentation system was brought to the field and using a free hand intersection technique, cortical screws were placed bilaterally from L4 to S1. The medial border of the pedicle was visualized through the spinal canal to confirm no medial or inferior breech. This was felt to be satisfactory. At this point the Protios soaked allograft was then packed into the lateral gutters beneath the screw heads, along the decorticated transverse processes and lateral facet joints for the posterolateral arthrodesis at L4-5 and L5-S1. Appropriately sized rods were then selected and bent into additional lordosis and laid into the pedicle screw heads. The set screws were then applied and tightened to the appropriate torque. C-arm fluoroscopy was brought in and used to obtain images to confirm appropriate hardware level and placement. This was felt to be satisfactory. With this, the wound was liberally irrigated and a hemovac drain was inserted through a separate incision in a subfascial plane. The lumbodorsal fascia was approximated with a # 1 Vicryl suture in an interrupted fashion. The subcutaneous tissue and skin were approximated in a layered fashion. Dermabond was applied. Sterile dressings were applied. The patient tolerated the procedure well and was returned to the postanesthesia care unit in stable condition. At the end of the case, all sponge, needle, and instrument counts were correct.       Navjot James MD

## 2022-09-07 NOTE — PROGRESS NOTES
TRANSFER - IN REPORT:    Verbal report received from Evette, [facility] on Marissa Colder  being received from PACU for routine progression of patient care      Report consisted of patient's Situation, Background, Assessment and   Recommendations(SBAR). Information from the following report(s) Nurse Handoff Report was reviewed with the receiving nurse. Opportunity for questions and clarification was provided. Assessment completed upon patient's arrival to unit and care assumed.

## 2022-09-07 NOTE — PROGRESS NOTES
PHYSICAL THERAPY Initial Assessment, Daily Note, and PM  (Link to Caseload Tracking: PT Visit Days : 1  Acknowledge Orders  Time In/Out  PT Charge Capture  Rehab Caseload Tracker    Juan Schroeder is a 67 y.o. male   PRIMARY DIAGNOSIS: Spondylolisthesis of multiple sites in spine  Lumbar radiculopathy [M54.16]  Spondylolisthesis [M43.10]  Other secondary scoliosis, lumbosacral region [M41.57]  S/P lumbar fusion [Z98.1]  Procedure(s) (LRB):  L4-S1 laminectomy and fusion with allograft and instrumentation; transforaminal lumbar interbody fusion and L3 laminectomy (N/A)  Day of Surgery  Reason for Referral: Difficulty in walking, Not elsewhere classified (R26.2)  Other abnormalities of gait and mobility (R26.89)  Low Back Pain (M54.5)  Inpatient: Payor: Roberto Board / Plan: MEDICARE PART A AND B / Product Type: *No Product type* /     ASSESSMENT:     REHAB RECOMMENDATIONS:   Recommendation to date pending progress:  Setting:  Home Health Therapy    Equipment:    To Be Determined     ASSESSMENT:  Mr. Dhara Shea is a 67year old M who presents s/p L4-S1 laminectomy & fusion. This date pt performs mobility including bed mobility, sitting balance activities, sit <>s tand transfers, side steps EOB with CGA/Gladys and BUE support at RW. Pt endorsed some lightheadedness with standing position, so further OOB mobility deferred until tomorrow POD1. Pt did well adhering to spinal precautions and verbalized understanding in response to instruction. Pt presents as functioning below his baseline, with deficits in mobility including transfers, gait, balance, and activity tolerance. Pt will benefit from skilled therapy services to address stated deficits to promote return to highest level of function, independence, and safety. Will continue to follow.      [address] Mobility Inpatient Short Form  AM-PAC Mobility Inpatient   How much difficulty turning over in bed?: A Little  How much difficulty sitting down on / standing up from a chair with arms?: A Little  How much difficulty moving from lying on back to sitting on side of bed?: A Little  How much help from another person moving to and from a bed to a chair?: A Little  How much help from another person needed to walk in hospital room?: A Little  How much help from another person for climbing 3-5 steps with a railing?: A Little  AM-PAC Inpatient Mobility Raw Score : 18  AM-PAC Inpatient T-Scale Score : 43.63  Mobility Inpatient CMS 0-100% Score: 46.58  Mobility Inpatient CMS G-Code Modifier : CK    SUBJECTIVE:   Mr. David Gracia states, \"My pain is ok because they just gave me medicine\"     Social/Functional Lives With: Spouse  Type of Home: House  Home Layout: One level  Home Access: Level entry  Has the patient had two or more falls in the past year or any fall with injury in the past year?: No    OBJECTIVE:     PAIN: VITALS / O2: PRECAUTION / Toina Francisco / Kira Riling:   Pre Treatment:   Pain Assessment: 0-10  Pain Level: 3  Pain Location: Back  Pain Orientation: Lower;Mid  Pain Descriptors: Sore      Post Treatment: 4/10 Vitals        Oxygen  O2 Device: Nasal cannula  O2 Flow Rate (L/min): 2 L/min   Garcia Catheter, IV, and NADIA Drain    RESTRICTIONS/PRECAUTIONS:  Restrictions/Precautions: Fall Risk        Spinal Precautions: No Bending, No Lifting, No Twisting        GROSS EVALUATION: Intact Impaired (Comments):   AROM [x]     PROM [x]    Strength [x]     Balance [] Sitting - Static: Good  Sitting - Dynamic: Good  Standing - Static: Fair, +  Standing - Dynamic: Fair   Posture [] Forward Head  Rounded Shoulders   Sensation [x]     Coordination [x]      Tone []     Edema []    Activity Tolerance []  Limited post-op; expected to improve    []      COGNITION/  PERCEPTION: Intact Impaired (Comments):   Orientation [x]     Vision [x]     Hearing [x]     Cognition  [x]       MOBILITY: I Mod I S SBA CGA Min Mod Max Total  NT x2 Comments:   Bed Mobility    Rolling [] [] [] [] day(s). (5.) [facility] therapeutic exercises x 20 min for HEP with INDEPENDENCE to improve strength, endurance, and functional mobility within 7 treatment day(s). FREQUENCY AND DURATION: BID for duration of hospital stay or until stated goals are met, whichever comes first.    THERAPY PROGNOSIS: Good    PROBLEM LIST:   (Skilled intervention is medically necessary to address:)  Decreased Activity Tolerance  Decreased Balance  Decreased Coordination  Decreased Gait Ability  Decreased Safety Awareness  Decreased Strength  Decreased Transfer Abilities  Increased Pain INTERVENTIONS PLANNED:   (Benefits and precautions of physical therapy have been discussed with the patient.)  Therapeutic Activity  Therapeutic Exercise/HEP  Neuromuscular Re-education  Gait Training  Education       TREATMENT:   EVALUATION: LOW COMPLEXITY: (Untimed Charge)    TREATMENT:   Therapeutic Activity (10 Minutes): Therapeutic activity included Rolling, Supine to Sit, Sit to Supine, Scooting, Lateral Scooting, Transfer Training, Ambulation on level ground, Sitting balance , and Standing balance to improve functional Activity tolerance, Balance, Coordination, Mobility, and Strength. TREATMENT GRID:  N/A    AFTER TREATMENT PRECAUTIONS: Bed, Bed/Chair Locked, Call light within reach, Needs within reach, RN notified, and Visitors at bedside    INTERDISCIPLINARY COLLABORATION:  RN/ PCT, PT/ PTA, and Rehab Attendant    EDUCATION: Education Given To: Patient; Family  Education Provided: Role of Therapy;Plan of Care;Precautions;Transfer Training;Equipment  Education Outcome: Verbalized understanding;Demonstrated understanding    TIME IN/OUT:  Time In: [address]  Time Out: [address]  Minutes: Dona Montgomery, PT

## 2022-09-08 PROCEDURE — 94760 N-INVAS EAR/PLS OXIMETRY 1: CPT

## 2022-09-08 PROCEDURE — 97165 OT EVAL LOW COMPLEX 30 MIN: CPT

## 2022-09-08 PROCEDURE — 97530 THERAPEUTIC ACTIVITIES: CPT

## 2022-09-08 PROCEDURE — 6370000000 HC RX 637 (ALT 250 FOR IP): Performed by: ORTHOPAEDIC SURGERY

## 2022-09-08 PROCEDURE — 2500000003 HC RX 250 WO HCPCS: Performed by: ORTHOPAEDIC SURGERY

## 2022-09-08 PROCEDURE — 6360000002 HC RX W HCPCS: Performed by: PHYSICIAN ASSISTANT

## 2022-09-08 PROCEDURE — 6360000002 HC RX W HCPCS: Performed by: ORTHOPAEDIC SURGERY

## 2022-09-08 PROCEDURE — 1100000000 HC RM PRIVATE

## 2022-09-08 PROCEDURE — 6370000000 HC RX 637 (ALT 250 FOR IP): Performed by: PHYSICIAN ASSISTANT

## 2022-09-08 PROCEDURE — 2580000003 HC RX 258: Performed by: ORTHOPAEDIC SURGERY

## 2022-09-08 PROCEDURE — 2700000000 HC OXYGEN THERAPY PER DAY

## 2022-09-08 RX ORDER — DOCUSATE SODIUM 100 MG/1
100 CAPSULE, LIQUID FILLED ORAL 2 TIMES DAILY
Status: DISCONTINUED | OUTPATIENT
Start: 2022-09-08 | End: 2022-09-09 | Stop reason: HOSPADM

## 2022-09-08 RX ORDER — METOCLOPRAMIDE HYDROCHLORIDE 5 MG/ML
10 INJECTION INTRAMUSCULAR; INTRAVENOUS EVERY 6 HOURS
Status: COMPLETED | OUTPATIENT
Start: 2022-09-08 | End: 2022-09-09

## 2022-09-08 RX ORDER — POLYETHYLENE GLYCOL 3350 17 G/17G
17 POWDER, FOR SOLUTION ORAL DAILY
Status: DISCONTINUED | OUTPATIENT
Start: 2022-09-08 | End: 2022-09-09 | Stop reason: HOSPADM

## 2022-09-08 RX ORDER — PANTOPRAZOLE SODIUM 40 MG/1
40 TABLET, DELAYED RELEASE ORAL
Status: DISCONTINUED | OUTPATIENT
Start: 2022-09-08 | End: 2022-09-09 | Stop reason: HOSPADM

## 2022-09-08 RX ADMIN — ACETAMINOPHEN 650 MG: 325 TABLET ORAL at 03:56

## 2022-09-08 RX ADMIN — OXYCODONE 10 MG: 5 TABLET ORAL at 13:15

## 2022-09-08 RX ADMIN — CEFAZOLIN SODIUM 2000 MG: 100 INJECTION, POWDER, LYOPHILIZED, FOR SOLUTION INTRAVENOUS at 00:17

## 2022-09-08 RX ADMIN — DOCUSATE SODIUM 100 MG: 100 CAPSULE, LIQUID FILLED ORAL at 09:17

## 2022-09-08 RX ADMIN — AMLODIPINE BESYLATE 5 MG: 5 TABLET ORAL at 09:01

## 2022-09-08 RX ADMIN — DOCUSATE SODIUM 100 MG: 100 CAPSULE, LIQUID FILLED ORAL at 21:07

## 2022-09-08 RX ADMIN — ACETAMINOPHEN 650 MG: 325 TABLET ORAL at 21:07

## 2022-09-08 RX ADMIN — ONDANSETRON 4 MG: 2 INJECTION INTRAMUSCULAR; INTRAVENOUS at 09:03

## 2022-09-08 RX ADMIN — METOCLOPRAMIDE 10 MG: 5 INJECTION, SOLUTION INTRAMUSCULAR; INTRAVENOUS at 16:47

## 2022-09-08 RX ADMIN — PANTOPRAZOLE SODIUM 40 MG: 40 TABLET, DELAYED RELEASE ORAL at 09:17

## 2022-09-08 RX ADMIN — SODIUM CHLORIDE, PRESERVATIVE FREE 10 ML: 5 INJECTION INTRAVENOUS at 09:24

## 2022-09-08 RX ADMIN — SODIUM CHLORIDE, PRESERVATIVE FREE 10 ML: 5 INJECTION INTRAVENOUS at 21:08

## 2022-09-08 RX ADMIN — METOCLOPRAMIDE 10 MG: 5 INJECTION, SOLUTION INTRAMUSCULAR; INTRAVENOUS at 21:07

## 2022-09-08 RX ADMIN — ONDANSETRON 4 MG: 2 INJECTION INTRAMUSCULAR; INTRAVENOUS at 01:02

## 2022-09-08 RX ADMIN — SERTRALINE 50 MG: 50 TABLET, FILM COATED ORAL at 09:01

## 2022-09-08 RX ADMIN — ACETAMINOPHEN 650 MG: 325 TABLET ORAL at 09:02

## 2022-09-08 RX ADMIN — OXYCODONE 10 MG: 5 TABLET ORAL at 21:14

## 2022-09-08 RX ADMIN — ATORVASTATIN CALCIUM 80 MG: 80 TABLET, FILM COATED ORAL at 21:07

## 2022-09-08 RX ADMIN — ACETAMINOPHEN 650 MG: 325 TABLET ORAL at 16:46

## 2022-09-08 RX ADMIN — METOCLOPRAMIDE 10 MG: 5 INJECTION, SOLUTION INTRAMUSCULAR; INTRAVENOUS at 11:32

## 2022-09-08 RX ADMIN — POLYETHYLENE GLYCOL 3350 17 G: 17 POWDER, FOR SOLUTION ORAL at 09:17

## 2022-09-08 RX ADMIN — OXYCODONE 5 MG: 5 TABLET ORAL at 00:27

## 2022-09-08 ASSESSMENT — PAIN DESCRIPTION - ONSET
ONSET: GRADUAL
ONSET: ON-GOING
ONSET: GRADUAL

## 2022-09-08 ASSESSMENT — PAIN DESCRIPTION - ORIENTATION
ORIENTATION: LOWER

## 2022-09-08 ASSESSMENT — PAIN SCALES - GENERAL
PAINLEVEL_OUTOF10: 0
PAINLEVEL_OUTOF10: 0
PAINLEVEL_OUTOF10: 7
PAINLEVEL_OUTOF10: 5
PAINLEVEL_OUTOF10: 3
PAINLEVEL_OUTOF10: 7
PAINLEVEL_OUTOF10: 0

## 2022-09-08 ASSESSMENT — PAIN DESCRIPTION - FREQUENCY
FREQUENCY: INTERMITTENT

## 2022-09-08 ASSESSMENT — PAIN DESCRIPTION - LOCATION
LOCATION: BACK

## 2022-09-08 ASSESSMENT — PAIN DESCRIPTION - DESCRIPTORS
DESCRIPTORS: ACHING

## 2022-09-08 ASSESSMENT — PAIN DESCRIPTION - PAIN TYPE
TYPE: SURGICAL PAIN
TYPE: ACUTE PAIN
TYPE: SURGICAL PAIN

## 2022-09-08 ASSESSMENT — PAIN - FUNCTIONAL ASSESSMENT
PAIN_FUNCTIONAL_ASSESSMENT: PREVENTS OR INTERFERES SOME ACTIVE ACTIVITIES AND ADLS
PAIN_FUNCTIONAL_ASSESSMENT: PREVENTS OR INTERFERES SOME ACTIVE ACTIVITIES AND ADLS

## 2022-09-08 NOTE — PROGRESS NOTES
PHYSICAL THERAPY: Daily Note AM   (Link to Caseload Tracking: PT Visit Days : 2  Time In/Out PT Charge Capture  Rehab Caseload Tracker  Orders    Jenise Mariscal is a 67 y.o. 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 Vince Porter was supine upon contact and agreeable to therapy. CGA for bed mobility and transfers with cues to maintain spinal precautions/log rolling technique. Ambulated 250' RW CGA with cues for activity pacing and proximity to walk with chair follow for safety. In room, reviewed spinal precautions and evaluated patient understanding of HEP to perform outside of therapy at least 3 times a day. Participated in LE exercises with cues for activity pacing and LE sequencing. Steadily progressing towards goals and will continue PT efforts.      SUBJECTIVE:   Mr. Vince Porter states, \"I feel good\"     Social/Functional Lives With: Spouse  Type of Home: House  Home Layout: One level  Home Access: Level entry  Has the patient had two or more falls in the past year or any fall with injury in the past year?: No  ADL Assistance: Independent  Homemaking Assistance: Independent  OBJECTIVE:     PAIN: VITALS / O2: PRECAUTION / Musa Dyke / DRAINS:   Pre Treatment:   Pain Level: 0      Post Treatment: 0 Vitals        Oxygen    None    RESTRICTIONS/PRECAUTIONS:  Restrictions/Precautions  Restrictions/Precautions: Fall Risk  Position Activity Restriction  Spinal Precautions: No Bending, No Lifting, No Twisting  Restrictions/Precautions: Fall Risk     MOBILITY: I Mod I S SBA CGA Min Mod Max Total  NT x2 Comments:   Bed Mobility    Rolling [] [] [] [x] [x] [] [] [] [] [] []    Supine to Sit [] [] [] [x] [x] [] [] [] [] [] []    Scooting [] [] [] [x] [x] [] [] [] [] [] []    Sit to Supine [] [] [] [x] [x] [] [] [] [] [] []    Transfers    Sit to Stand [] [] [] [x] [x] [] [] [] [] [] []    Bed to Chair [] [] [] [x] [x] [] [] [] [] [] []    Stand to Sit [] [] [] [x] [x] [] [] [] [] [] []     [] [] [] [x] [x] [] [] [] [] [] []    I=Independent, Mod I=Modified Independent, S=Supervision, SBA=Standby Assistance, CGA=Contact Guard Assistance,   Min=Minimal Assistance, Mod=Moderate Assistance, Max=Maximal Assistance, Total=Total Assistance, NT=Not Tested    BALANCE: Good Fair+ Fair Fair- Poor NT Comments   Sitting Static [x] [] [] [] [] []    Sitting Dynamic [x] [] [] [] [] []              Standing Static [x] [] [] [] [] []    Standing Dynamic [] [x] [] [] [] []      GAIT: I Mod I S SBA CGA Min Mod Max Total  NT x2 Comments:   Level of Assistance [] [] [] [x] [x] [] [] [] [] [] []    Distance 250 feet    DME Rolling Walker    Gait Quality Decreased toshia  and Decreased step clearance    Weightbearing Status      Stairs      I=Independent, Mod I=Modified Independent, S=Supervision, SBA=Standby Assistance, CGA=Contact Guard Assistance,   Min=Minimal Assistance, Mod=Moderate Assistance, Max=Maximal Assistance, Total=Total Assistance, NT=Not Tested    PLAN:   ACUTE PHYSICAL THERAPY GOALS:   (Developed with and agreed upon by patient and/or caregiver.)    (1.) Waymond Elaine  will move from supine to sit and sit to supine , scoot up and down, and roll side to side with MODIFIED INDEPENDENCE within 7 treatment day(s).     (2.) Waymond Elaine will transfer from bed to chair and chair to bed with MODIFIED INDEPENDENCE using the least restrictive device within 7 treatment day(s). (3.) Eual Gauze will ambulate with MODIFIED INDEPENDENCE for 500 feet with the least restrictive device within 7 treatment day(s). (4.) Eual Gauze will perform standing static and dynamic balance activities x 25 minutes with MODIFIED INDEPENDENCE to improve safety within 7 treatment day(s). (5.) Eual Gauze will perform therapeutic exercises x 20 min for HEP with INDEPENDENCE to improve strength, endurance, and functional mobility within 7 treatment day(s). FREQUENCY AND DURATION: BID for duration of hospital stay or until stated goals are met, whichever comes first.    TREATMENT:   TREATMENT:   Therapeutic Activity (24 Minutes): Therapeutic activity included Supine to Sit, Lateral Scooting, Transfer Training, Ambulation on level ground, Sitting balance , Standing balance, and LE exercises to improve functional Activity tolerance, Balance, Coordination, Mobility, and Strength. TREATMENT GRID:   Date:  9/8 Date:   Date:     Activity/Exercise Parameters Parameters Parameters   Ankle pumps 1x15     Knee extensions 1x10     Knee raises 1x10     Hip abduction 1x10     Arm chair push 1x10                     AFTER TREATMENT PRECAUTIONS: Alarm Activated, Bed/Chair Locked, Call light within reach, Chair, Needs within reach, RN notified, and Visitors at bedside    INTERDISCIPLINARY COLLABORATION:  RN/ PCT, PT/ PTA, and SPTA    EDUCATION:      TIME IN/OUT:  Time In: [address]  Time Out: [address]  Minutes: [address] [facility] [place].  CORKY Barnard   [place], [place]

## 2022-09-08 NOTE — PROGRESS NOTES
Pt alert and oriented x3, sitting in chair watching television, pt denies pain, will continue to monitor.  Trae Pate, GTC, SN

## 2022-09-08 NOTE — PROGRESS NOTES
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 Had emesis last night . Objective:     Vital Signs:    Blood pressure 127/89, pulse 79, temperature 98.6 °F (37 °C), temperature source Oral, resp. rate 17, height 5' 8\" (1.727 m), weight 201 lb (91.2 kg), SpO2 96 %. Temp (24hrs), Av.2 °F (36.8 °C), Min:97.5 °F (36.4 °C), Max:98.7 °F (37.1 °C)      701 - 1900  In: 509 [P.O.:770]  Out: 65 [Drains:65]  1901 -  0700  In: 2250 [I.V.:2200]  Out: 2665 [Urine:1625; Drains:290] drain output is bloody    LAB:    No results for input(s): HGB, WBC, PLT in the last 72 hours. Physical Exam    General:   Alert and oriented. No acute distress  Lungs:  Respirations unlabored. Extremities: No evidence of cyanosis. Calves soft, nontender. Moves both upper and lower extremities.    Dressing:  clean, dry, and intact  Neuro:  no deficit  ABD: BS present, distended, tympanic, nontender    Assessment:      Patient Active Problem List   Diagnosis    Lumbosacral neuritis    Degeneration of lumbar or lumbosacral intervertebral disc    Spondylolisthesis of multiple sites in spine    Lumbar radiculopathy    Other secondary scoliosis, lumbosacral region    S/P lumbar fusion       Plan:     Continue PT/OT  Discontinue: drain Friday  Consult: none    Bowel regimen including Dulcolax sodium, MiraLAX, Reglan  Ambulate    Anticipate Discharge To: HOME once progress with physical therapy, likely Friday if nausea resolves     Signed By: Yenifer Priest, ROSENDO

## 2022-09-08 NOTE — PROGRESS NOTES
PHYSICAL THERAPY: Daily Note AM   (Link to Caseload Tracking: PT Visit Days : 2  Time In/Out PT Charge Capture  Rehab Caseload Tracker  Orders    Clifford Soares is a 67 y.o. 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 Darshan Vargas was supine upon contact and agreeable to therapy. SBA for bed mobility and transfers with cues to maintain spinal precautions/log rolling technique. Ambulated 500' RW CGA with cues for activity pacing and proximity to walker. In room, reviewed spinal precautions and evaluated patient understanding of HEP to perform outside of therapy at least 3 times a day. Participated in LE exercises with cues for activity pacing and LE sequencing. Steadily progressing towards goals and will continue PT efforts.      SUBJECTIVE:   Mr. Darshan Vargas states, \"I can continue\"     Social/Functional Lives With: Spouse  Type of Home: House  Home Layout: One level  Home Access: Level entry  Has the patient had two or more falls in the past year or any fall with injury in the past year?: No  ADL Assistance: Independent  Homemaking Assistance: Independent  OBJECTIVE:     PAIN: VITALS / O2: PRECAUTION / Omaira Pies / DRAINS:   Pre Treatment:   Pain Level: 0      Post Treatment: 0 Vitals        Oxygen    None    RESTRICTIONS/PRECAUTIONS:  Restrictions/Precautions  Restrictions/Precautions: Fall Risk  Position Activity treatment day(s). (3.) Tony Swift will ambulate with MODIFIED INDEPENDENCE for 500 feet with the least restrictive device within 7 treatment day(s). (4.) Tony Swift will perform standing static and dynamic balance activities x 25 minutes with MODIFIED INDEPENDENCE to improve safety within 7 treatment day(s). (5.) Tony Swift will perform therapeutic exercises x 20 min for HEP with INDEPENDENCE to improve strength, endurance, and functional mobility within 7 treatment day(s). FREQUENCY AND DURATION: BID for duration of hospital stay or until stated goals are met, whichever comes first.    TREATMENT:   TREATMENT:   Therapeutic Activity (24 Minutes): Therapeutic activity included Supine to Sit, Lateral Scooting, Transfer Training, Ambulation on level ground, Sitting balance , Standing balance, and LE exercises to improve functional Activity tolerance, Balance, Coordination, Mobility, and Strength. TREATMENT GRID:   Date:  9/8 Date:   Date:     Activity/Exercise Parameters Parameters Parameters   Ankle pumps 1x15     Knee extensions 1x10     Knee raises 1x10     Hip abduction 1x10     Arm chair push 1x10                     AFTER TREATMENT PRECAUTIONS: Alarm Activated, Bed/Chair Locked, Call light within reach, Chair, Needs within reach, RN notified, and Visitors at bedside    INTERDISCIPLINARY COLLABORATION:  RN/ PCT, PT/ PTA, and SPTA    EDUCATION:      TIME IN/OUT:  Time In: 1325  Time Out: [address]  Minutes: [address] [facility].  CORKY Barnard   [place], [place]

## 2022-09-08 NOTE — PROGRESS NOTES
OCCUPATIONAL THERAPY Initial Assessment and Daily Note       OT Visit Days: 1  Acknowledge Orders  Time  OT Charge Capture  Rehab Caseload Tracker      Courtney Felty is a 67 y.o. 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 Geovani Bernabe is a 68 y/o M who is POD #1 from above procedure by Dr. Sharon Tyler. Baseline independent & active. Today reports pain 3/10 and nausea. Educated patient on spinal precautions & log roll technique. Mobile with CGA & RW. Demonstrate log roll with minimal assistance. He is functioning below baseline & would benefit from continued OT. Will follow. Via [employer] AM-PAC 6 Clicks Daily Activity Inpatient Short Form:    AM-PAC Daily Activity Inpatient   How much help for putting on and taking off regular lower body clothing?: A Little  How much help for Bathing?: A Little  How much help for Toileting?: A Little  How much help for putting on and taking off regular upper body clothing?: A Little  How much help for taking care of personal grooming?: None  How much help for eating meals?: None  AM-PAC Inpatient Daily Activity Raw Score: 20  AM-PAC Inpatient ADL T-Scale Score : 42.03  ADL Inpatient CMS 0-100% Score: 38.32  ADL Inpatient CMS G-Code Modifier : CJ           SUBJECTIVE:     Mr. Geovani Bernabe states, \"I don't like throwing up. \" Social/Functional Lives With: Spouse  Type of Home: House  Home Layout: One level  Home Access: Level entry  Has the patient had two or more falls in the past year or any fall with injury in the past year?: No  ADL Assistance: Independent  Homemaking Assistance: Independent    OBJECTIVE:     LINES / Marinus Saliva / AIRWAY: IV and NADIA Drain    RESTRICTIONS/PRECAUTIONS:  Restrictions/Precautions: Fall Risk  Position Activity Restriction  Spinal Precautions: No Bending, No Lifting, No Twisting    PAIN: VITALS / O2:   Pre Treatment:   Pain Assessment: 0-10  Pain Level: 3      Post Treatment: same       Vitals          Oxygen            GROSS EVALUATION: INTACT IMPAIRED   (See Comments)   UE AROM [x] []   UE PROM [x] []   Strength [x]       Posture / Balance [] Posture: Good  Sitting - Static: Good  Sitting - Dynamic: Good  Standing - Static: Fair, +  Standing - Dynamic: Fair   Sensation []  NT   Coordination []  NT     Tone []  NT     Edema [] NT   Activity Tolerance [x]       Hand Dominance R [] L []      COGNITION/  PERCEPTION: INTACT IMPAIRED   (See Comments)   Orientation [x]     Vision [x]     Hearing [x]     Cognition  [x]     Perception [x]       MOBILITY: I Mod I S SBA CGA Min Mod Max Total  NT x2 Comments:   Bed Mobility    Rolling [] [] [] [] [] [] [] [] [] [] []    Supine to Sit [] [] [] [] [] [] [] [] [] [] []    Scooting [] [] [] [] [] [] [] [] [] [] []    Sit to Supine [] [] [] [] [] [] [] [] [] [] []    Transfers    Sit to Stand [] [] [] [] [] [] [] [] [] [] []    Bed to Chair [] [] [] [] [] [] [] [] [] [] []    Stand to Sit [] [] [] [] [] [] [] [] [] [] []    Tub/Shower [] [] [] [] [] [] [] [] [] [] []     Toilet [] [] [] [] [] [] [] [] [] [] []      [] [] [] [] [] [] [] [] [] [] []    I=Independent, Mod I=Modified Independent, S=Supervision/Setup, SBA=Standby Assistance, CGA=Contact Guard Assistance, Min=Minimal Assistance, Mod=Moderate Assistance, Max=Maximal Assistance, Total=Total Assistance, NT=Not Tested    ACTIVITIES OF DAILY LIVING: I Mod I S SBA CGA Min Mod Max Total NT Comments   BASIC ADLs:              Upper Body Bathing  [] [] [] [] [] [] [] [] [] []    Lower Body Bathing [] [] [] [] [] [] [] [] [] []    Toileting [] [] [] [] [] [] [] [] [] []    Upper Body Dressing [] [] [] [] [] [] [] [] [] []    Lower Body Dressing [] [] [] [] [] [] [] [] [] []    Feeding [] [] [] [] [] [] [] [] [] []    Grooming [] [] [] [] [] [] [] [] [] []    Personal Device Care [] [] [] [] [] [] [] [] [] []    Functional Mobility [] [] [] [] [x] [] [] [] [] []    I=Independent, Mod I=Modified Independent, S=Supervision/Setup, SBA=Standby Assistance, CGA=Contact Guard Assistance, Min=Minimal Assistance, Mod=Moderate Assistance, Max=Maximal Assistance, Total=Total Assistance, NT=Not Tested    PLAN:     FREQUENCY/DURATION   OT Plan of Care: 3 times/week for duration of hospital stay or until stated goals are met, whichever comes first.    ACUTE OCCUPATIONAL THERAPY GOALS:   (Developed with and agreed upon by patient and/or caregiver.)  1. Patient will verbalize and demonstrate understanding of spinal precautions with 100% accuracy during ADLs. 2. Patient will complete lower body bathing and dressing with setup and adaptive equipment as needed. 3. Patient will complete functional transfers with supervision and adaptive equipment as needed. 4. Patient will complete toileting and toilet transfer with supervision. 5. Patient will complete functional mobility of household distances with supervision and adaptive equipment as needed.    6. Patient will demonstrate ability to log roll in bed with supervision and no verbal cues from therapist.     Timeframe: 7 visits         PROBLEM LIST:   (Skilled intervention is medically necessary to address:)  Decreased ADL/Functional Activities  Decreased Activity Tolerance  Decreased AROM/PROM  Decreased Balance  Decreased Coordination  Decreased Gait Ability  Decreased Transfer

## 2022-09-08 NOTE — CARE COORDINATION
Chart screened by  for potential discharge needs or concerns. PT/OT evals complete with the recommendation for [facility] therapy and DME (BSC) at [facility]. CM will follow up with pt tomorrow to discuss these recommendations. Please notify/consult  if other discharge needs arise. 09/08/22 4636   Service Assessment   Patient Orientation Alert and Oriented   Cognition Alert   History Provided By Medical Record   Primary Caregiver Self   Support Systems Spouse/Significant Other   PCP Verified by CM Yes   Last Visit to PCP Within last 3 months   Prior Functional Level Independent in ADLs/IADLs   Current Functional Level Independent in ADLs/IADLs   Can patient return to prior living arrangement Yes   Ability to make needs known: Good   Family able to assist with home care needs: Yes   Financial Resources Medicare   Social/Functional History   Lives With Spouse   Type of [address] One level   Home Access Level entry   ADL Assistance Independent   Homemaking Assistance Independent   Ambulation Assistance Independent   Transfer Assistance Independent   Occupation Retired   Discharge Planning   Type of [address] Prior To Admission None   [address]   DME Bedside Commode   Potential Assistance Purchasing Medications No   Type of [address] OT;PT   Patient expects to be discharged to: [address] Discharge   Transition of [address] (CM Consult) Home Health;DME/Supply [address] Discharge OT;PT;DME   [address] Provided?  No   Mode of Transport at Discharge Other (see comment)  (family)   Confirm Follow Up Transport Family   Condition of Participation: Discharge Planning   The Plan for Transition of Care is related to the following treatment goals: Home health therapy to improve pt's strength and functional abilities s/p spine surgery

## 2022-09-09 VITALS
OXYGEN SATURATION: 94 % | BODY MASS INDEX: 30.46 KG/M2 | DIASTOLIC BLOOD PRESSURE: 80 MMHG | SYSTOLIC BLOOD PRESSURE: 113 MMHG | HEIGHT: 68 IN | TEMPERATURE: 98.2 F | WEIGHT: 201 LBS | RESPIRATION RATE: 16 BRPM | HEART RATE: 87 BPM

## 2022-09-09 PROCEDURE — 6370000000 HC RX 637 (ALT 250 FOR IP): Performed by: PHYSICIAN ASSISTANT

## 2022-09-09 PROCEDURE — 2580000003 HC RX 258: Performed by: ORTHOPAEDIC SURGERY

## 2022-09-09 PROCEDURE — 6360000002 HC RX W HCPCS: Performed by: PHYSICIAN ASSISTANT

## 2022-09-09 PROCEDURE — 97530 THERAPEUTIC ACTIVITIES: CPT

## 2022-09-09 PROCEDURE — 6370000000 HC RX 637 (ALT 250 FOR IP): Performed by: ORTHOPAEDIC SURGERY

## 2022-09-09 RX ADMIN — OXYCODONE 10 MG: 5 TABLET ORAL at 05:28

## 2022-09-09 RX ADMIN — HYDROCHLOROTHIAZIDE 25 MG: 25 TABLET ORAL at 09:48

## 2022-09-09 RX ADMIN — AMLODIPINE BESYLATE 5 MG: 5 TABLET ORAL at 09:46

## 2022-09-09 RX ADMIN — POLYETHYLENE GLYCOL 3350 17 G: 17 POWDER, FOR SOLUTION ORAL at 09:48

## 2022-09-09 RX ADMIN — LOSARTAN POTASSIUM 100 MG: 50 TABLET, FILM COATED ORAL at 09:48

## 2022-09-09 RX ADMIN — SERTRALINE 50 MG: 50 TABLET, FILM COATED ORAL at 09:48

## 2022-09-09 RX ADMIN — ACETAMINOPHEN 650 MG: 325 TABLET ORAL at 03:28

## 2022-09-09 RX ADMIN — ACETAMINOPHEN 650 MG: 325 TABLET ORAL at 09:46

## 2022-09-09 RX ADMIN — PANTOPRAZOLE SODIUM 40 MG: 40 TABLET, DELAYED RELEASE ORAL at 05:26

## 2022-09-09 RX ADMIN — DOCUSATE SODIUM 100 MG: 100 CAPSULE, LIQUID FILLED ORAL at 09:49

## 2022-09-09 RX ADMIN — METOCLOPRAMIDE 10 MG: 5 INJECTION, SOLUTION INTRAMUSCULAR; INTRAVENOUS at 03:28

## 2022-09-09 RX ADMIN — SODIUM CHLORIDE, PRESERVATIVE FREE 5 ML: 5 INJECTION INTRAVENOUS at 09:52

## 2022-09-09 RX ADMIN — OXYCODONE 5 MG: 5 TABLET ORAL at 09:46

## 2022-09-09 ASSESSMENT — PAIN DESCRIPTION - PAIN TYPE
TYPE: ACUTE PAIN;SURGICAL PAIN
TYPE: ACUTE PAIN

## 2022-09-09 ASSESSMENT — PAIN DESCRIPTION - ONSET
ONSET: ON-GOING
ONSET: ON-GOING

## 2022-09-09 ASSESSMENT — PAIN SCALES - GENERAL
PAINLEVEL_OUTOF10: 5
PAINLEVEL_OUTOF10: 0
PAINLEVEL_OUTOF10: 7
PAINLEVEL_OUTOF10: 0

## 2022-09-09 ASSESSMENT — PAIN DESCRIPTION - ORIENTATION
ORIENTATION: LOWER
ORIENTATION: LOWER

## 2022-09-09 ASSESSMENT — PAIN DESCRIPTION - LOCATION
LOCATION: BACK
LOCATION: BACK

## 2022-09-09 ASSESSMENT — PAIN DESCRIPTION - FREQUENCY
FREQUENCY: INTERMITTENT
FREQUENCY: INTERMITTENT

## 2022-09-09 ASSESSMENT — PAIN DESCRIPTION - DESCRIPTORS
DESCRIPTORS: ACHING
DESCRIPTORS: ACHING

## 2022-09-09 ASSESSMENT — PAIN - FUNCTIONAL ASSESSMENT
PAIN_FUNCTIONAL_ASSESSMENT: ACTIVITIES ARE NOT PREVENTED
PAIN_FUNCTIONAL_ASSESSMENT: ACTIVITIES ARE NOT PREVENTED

## 2022-09-09 NOTE — CARE COORDINATION
Pt is medically cleared for dc to home today. CM met with pt to discuss [place] services and DME. Pt is agreeable with HH and expressed no preference of agency. Referral for [place] PT/OT services submitted to [facility]. 3in1 BSC supplied by [facility] delivered to room. Consent signed and faxed to their office. Pt confirmed her has a walker at home. No other dc needs or concerns identified or reported. 09/09/22 1112   Service Assessment   Patient Orientation Alert and Oriented   Cognition Alert   History Provided By Patient;Medical Record   Primary Caregiver Self   Support Systems Spouse/Significant Other   PCP Verified by CM Yes   Last Visit to PCP Within last 3 months   Prior Functional Level Independent in ADLs/IADLs   Current Functional Level Independent in ADLs/IADLs   Can patient return to prior living arrangement Yes   Ability to make needs known: Good   Family able to assist with home care needs: Yes   Financial Resources Medicare   Social/Functional History   Lives With Spouse   Type of [address] One level   Home Access Level entry   ADL Assistance Independent   Homemaking Assistance Independent   Ambulation Assistance Independent   Transfer Assistance Independent   Occupation Retired   Discharge Planning   Type of [address] Prior To Admission None   [address]   DME Ordered?  Bedside commode  (3in1 Bedside commode from [facility])   Potential Assistance Purchasing Medications No   Type of Home Care Services OT;PT   Patient expects to be discharged to: [address] Discharge   Transition of Care Consult (CM Consult) Home Health;DME/Supply Assistance  (No CM consult received)   Internal Home Health No  ([facility])   Reason Outside Agency Chosen Script used patient chose alternate agency   Services At/After Discharge OT;PT;Home Health;DME   [employer] Information Provided? No   Mode of Transport at Discharge Other (see comment)  (spouse)   Confirm Follow Up Transport Family   Condition of Participation: Discharge Planning   The Plan for Transition of Care is related to the following treatment goals: Home health therapy to improve pt's strength and functional abilities s/p spine surgery   The Patient and/or Patient Representative was provided with a Choice of Provider? Patient   The Patient and/Or Patient Representative agree with the Discharge Plan? Yes   Freedom of Choice list was provided with basic dialogue that supports the patient's individualized plan of care/goals, treatment preferences, and shares the quality data associated with the providers?   Yes

## 2022-09-09 NOTE — PROGRESS NOTES
The pt d/c home with [facility]. D/C instructions and script info provided by primary RN.

## 2022-09-09 NOTE — PROGRESS NOTES
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 Nausea resolved. Passing gas. States \"I can have BM when I get home\"       Objective:     Vital Signs:    Blood pressure 113/80, pulse 87, temperature 98.2 °F (36.8 °C), temperature source Oral, resp. rate 17, height 5' 8\" (1.727 m), weight 201 lb (91.2 kg), SpO2 94 %. Temp (24hrs), Av.3 °F (36.8 °C), Min:97.7 °F (36.5 °C), Max:98.7 °F (37.1 °C)      No intake/output data recorded.  1901 -  0700  In: 770 [P.O.:770]  Out: 1415 [Urine:1050; Drains:365]    LAB:    No results for input(s): HGB, WBC, PLT in the last 72 hours. Physical Exam    General:   Alert and oriented. No acute distress  Lungs:  Respirations unlabored. Extremities: No evidence of cyanosis. Calves soft, nontender. Moves both upper and lower extremities.    Dressing:  clean, dry, and intact  Neuro:  no deficit      Assessment:      Patient Active Problem List   Diagnosis    Lumbosacral neuritis    Degeneration of lumbar or lumbosacral intervertebral disc    Spondylolisthesis of multiple sites in spine    Lumbar radiculopathy    Other secondary scoliosis, lumbosacral region    S/P lumbar fusion       Plan:     Continue PT/OT  Discontinue: IV, daily dressing change, and drain    Consult: none    Anticipate Discharge To: HOME this am        Signed By: Florinda Corbett, LIBIA - CNP

## 2022-09-09 NOTE — PLAN OF CARE
Problem: Pain  Goal: Verbalizes/displays adequate comfort level or baseline comfort level  9/9/2022 1020 by Carlos Alberto Stack RN  Outcome: Adequate for Discharge  9/9/2022 1020 by Carlos Alberto Stack RN  Outcome: Adequate for Discharge  9/8/2022 2147 by Shae Contreras RN  Outcome: Progressing     Problem: Safety - Adult  Goal: Free from fall injury  9/9/2022 1020 by Carlos Alberto Stack RN  Outcome: Adequate for Discharge  9/9/2022 1020 by Carlos Alberto Stack RN  Outcome: Adequate for Discharge  9/8/2022 2147 by Shae Contreras RN  Outcome: Progressing     Problem: Discharge Planning  Goal: Discharge to home or other facility with appropriate resources  9/9/2022 1020 by Carlos Alberto Stack RN  Outcome: Adequate for Discharge  9/9/2022 1020 by Carlos Alberto Stack RN  Outcome: Adequate for Discharge  9/8/2022 2147 by Shae Contreras RN  Outcome: Progressing     Problem: ABCDS Injury Assessment  Goal: Absence of physical injury  9/9/2022 1020 by Carlos Alberto Stack RN  Outcome: Adequate for Discharge  9/9/2022 1020 by Carlos Alberto Stack RN  Outcome: Adequate for Discharge  9/8/2022 2147 by Shae Contreras RN  Outcome: Progressing

## 2022-09-09 NOTE — DISCHARGE SUMMARY
Discharge Summary    Patient ID:Logan Hilario  [id number]  1950  67 y.o. 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 Procedure:   1. Posterolateral and transforaminal lumbar interbody fusion L4-5 and L5-S1 including laminectomy at L4, L5, S1 for stenosis (CPT 23934, 17463, 77894, 63053 X 2)  2. Insertion biomechanical device L4-5 and L5-S1 (CPT 22853 X 2)  3. Lumbar laminectomy L3  with foraminotomies. (CPT Q5605307)  4. Cortical screw instrumentation  L4 through S1 . (CPT I7206973)  5. Allograft (CPT 66848)          INDICATIONS FOR ADMISSION/PROCEDURE:  The patient has had low back pain with radiation to the buttocks and lower extremities for an extended period of time. The symptoms and exam findings were felt to be consistent with neurogenic claudication. The preoperative radiographs and MRI/CT showed spondylolisthesis and spinal stenosis. Conservative measures have been exhausted. The symptoms progressed to the point where there is difficulty performing any task that requires prolonged standing or walking. In the outpatient setting the risks, benefits and potential complications of the above-listed procedure were discussed with her and an informed consent was obtained. HBG at Discharge: No results for input(s): HGB in the last 72 hours. Hospital Course: Patient admitted to ortho floor. Antibiotics were given postop. SCD and giuliana hose were in place for DVT prophylaxis. Garcia catheter was in place until POD#1 then discontinued. Patient did not receive blood transfusion. The patient tolerated pain medications and po diet. The hemovac drain output gradually diminished and was then removed.  Dressing remained clean, dry, and intact. Physical Therapy started on the day following surgery and progressed to ambulation without assistance. At the time of discharge, the patient had understanding of precautions needed following surgery. Postoperative complications requiring extended length of stay: None    Discharged to: Home    Discharge Medications:      Medication List        START taking these medications      ondansetron 4 MG disintegrating tablet  Commonly known as: ZOFRAN-ODT  Take 1 tablet by mouth 3 times daily as needed for Nausea or Vomiting     oxyCODONE-acetaminophen 5-325 MG per tablet  Commonly known as: Percocet  Take 1 tablet by mouth every 4 hours as needed for Pain for up to 7 days. Intended supply: 5 days.  Take lowest dose possible to manage pain            CONTINUE taking these medications      acetaminophen 650 MG extended release tablet  Commonly known as: TYLENOL     amLODIPine 5 MG tablet  Commonly known as: NORVASC     aspirin 81 MG EC tablet     atorvastatin 80 MG tablet  Commonly known as: LIPITOR     fish oil 1000 MG Caps     losartan-hydroCHLOROthiazide 100-25 MG per tablet  Commonly known as: HYZAAR     MENS MULTIVITAMIN PO     omeprazole 20 MG delayed release capsule  Commonly known as: PRILOSEC     sertraline 50 MG tablet  Commonly known as: ZOLOFT            STOP taking these medications      diclofenac 75 MG EC tablet  Commonly known as: VOLTAREN               Where to Get Your Medications        These medications were sent to [facility] [phone] Shan Clemons [phone] [address], [address]      Phone: [phone]   ondansetron 4 MG disintegrating tablet  oxyCODONE-acetaminophen 5-325 MG per tablet          Discharge instructions:  -Resume pre hospital diet            -Resume home medications per medical continuation form     -Follow up in office as scheduled   -Call doctor immediately if T>100.5, increased pain, swelling, drainage.   -If shortness of breath or chest pain, immediately go to ER  -Post surgical instruction sheet given to patient    Signed:  LIBIA Montiel - CNP  9/9/2022

## 2022-09-22 ENCOUNTER — OFFICE VISIT (OUTPATIENT)
Dept: ORTHOPEDIC SURGERY | Age: 72
End: 2022-09-22

## 2022-09-22 DIAGNOSIS — Z98.1 STATUS POST LUMBAR SPINAL ARTHRODESIS: Primary | ICD-10-CM

## 2022-09-22 PROCEDURE — 99024 POSTOP FOLLOW-UP VISIT: CPT | Performed by: ORTHOPAEDIC SURGERY

## 2022-09-22 NOTE — PROGRESS NOTES
Name: Alisha Yeh  YOB: 1950  Gender: male  MRN: [id number]  Age: 67 y.o. Chief Complaint: Lumbar spine surgery follow up    History of Present Illness:      Alisha Yeh  is here for 2-week follow up of his  lumbar TLIF surgery. He reports significant relief of preoperative lower extremity pain, weakness and parasthesias. There is the expected residual back stiffness. Medications:       Current Outpatient Medications:     ondansetron (ZOFRAN-ODT) 4 MG disintegrating tablet, Take 1 tablet by mouth 3 times daily as needed for Nausea or Vomiting, Disp: 21 tablet, Rfl: 0    atorvastatin (LIPITOR) 80 MG tablet, Take 80 mg by mouth at bedtime, Disp: , Rfl:     losartan-hydroCHLOROthiazide (HYZAAR) 100-25 MG per tablet, Take 1 tablet by mouth daily, Disp: , Rfl:     Omega-3 Fatty Acids (FISH OIL) 1000 MG CAPS, Take 1,000 mg by mouth daily, Disp: , Rfl:     Multiple Vitamins-Minerals (MENS MULTIVITAMIN PO), Take 1 tablet by mouth daily, Disp: , Rfl:     sertraline (ZOLOFT) 50 MG tablet, Take 50 mg by mouth daily, Disp: , Rfl:     acetaminophen (TYLENOL) 650 MG extended release tablet, Take 650 mg by mouth every 8 hours, Disp: , Rfl:     amLODIPine (NORVASC) 5 MG tablet, Take 5 mg by mouth daily, Disp: , Rfl:     aspirin 81 MG EC tablet, Take 81 mg by mouth daily, Disp: , Rfl:     omeprazole (PRILOSEC) 20 MG delayed release capsule, Take 20 mg by mouth daily, Disp: , Rfl:     Allergies: Allergies   Allergen Reactions    Tramadol Nausea And Vomiting         Physical Exam:      Respirations are unlabored and there is no evidence of cyanosis      Wound is healing nicely without erythema, drainage or underlying fluctuance    Gait is improved    Sensory testing reveals intact sensation to light touch and in the distribution of the L3-S1 dermatomes bilaterally.     Strength testing in the lower extremity reveals the following based on the 5 point grading scale:       HF (L2) H Ab (L5) KE (L3/4) ADF (L4) EHL (L5) A Ev (S1) APF (S1)   Right 5 5 5 5 5 5 5   Left 5 5 5 5 5 5 5        Radiographic Studies:     X-rays including AP and lateral views of the lumbar spine were reviewed and interpreted:     Postoperative changes are noted status post lumbar fusion with instrumentation. The hardware appears to be well-placed and intact. There is no evidence of significant osteolysis. No significant adjacent level decompensation. Alignment is maintained. Radiographic Impression:    Appropriate postoperative changes status post lumbar laminectomy and fusion without evidence for hardware failure or decompensation. Diagnosis:      ICD-10-CM    1. Status post lumbar spinal arthrodesis  Z98.1           Assessment/Plan: This patient is following the expected course following the spine surgery. I encouraged him to walk as much as possible for exercise. He can also begin a stationary bike, or other low impact aerobic exercise. We will limit lifting to 10 pounds for the next several weeks. Prolonged sitting should be avoided to minimize the strain on the lumbar area. Thereafter, activities will be gradually increased as tolerated. I will have him return for follow up in 6 weeks.           Navjot James MD    09/22/22  10:17 AM

## 2022-10-20 ENCOUNTER — TELEPHONE (OUTPATIENT)
Dept: ORTHOPEDIC SURGERY | Age: 72
End: 2022-10-20

## 2022-10-20 ENCOUNTER — OFFICE VISIT (OUTPATIENT)
Dept: ORTHOPEDIC SURGERY | Age: 72
End: 2022-10-20

## 2022-10-20 DIAGNOSIS — Z98.1 STATUS POST LUMBAR SPINAL ARTHRODESIS: Primary | ICD-10-CM

## 2022-10-20 PROCEDURE — 99024 POSTOP FOLLOW-UP VISIT: CPT | Performed by: ORTHOPAEDIC SURGERY

## 2022-10-20 RX ORDER — METHYLPREDNISOLONE 4 MG/1
4 TABLET ORAL SEE ADMIN INSTRUCTIONS
Qty: 1 KIT | Refills: 0 | Status: SHIPPED | OUTPATIENT
Start: 2022-10-20

## 2022-10-20 NOTE — TELEPHONE ENCOUNTER
Pt called and had sx last month. He states he's in severe pain and cant wait until next appointment. Please call pt to discuss.

## 2022-10-20 NOTE — TELEPHONE ENCOUNTER
Patient walked into office and did not wait for someone to return his call.  He will be worked in and told he may have to wait

## 2022-10-20 NOTE — PROGRESS NOTES
Name: Amy Velazquez  YOB: 1950  Gender: male  MRN: [id number]  Age: 67 y.o. Chief Complaint: Lumbar spine surgery follow up    History of Present Illness:      Amy Velazquez  is here for 6-week follow up of his  lumbar TLIF surgery. He has had a dramatic increase in pain, particularly on the left side of his back. He does not have any new radiating leg pain. His preoperative leg symptoms have improved. But, his current back pain is 10/10. Medications:       Current Outpatient Medications:     methylPREDNISolone (MEDROL DOSEPACK) 4 MG tablet, Take 1 tablet by mouth See Admin Instructions Per package instructions, Disp: 1 kit, Rfl: 0    ondansetron (ZOFRAN-ODT) 4 MG disintegrating tablet, Take 1 tablet by mouth 3 times daily as needed for Nausea or Vomiting, Disp: 21 tablet, Rfl: 0    atorvastatin (LIPITOR) 80 MG tablet, Take 80 mg by mouth at bedtime, Disp: , Rfl:     losartan-hydroCHLOROthiazide (HYZAAR) 100-25 MG per tablet, Take 1 tablet by mouth daily, Disp: , Rfl:     Omega-3 Fatty Acids (FISH OIL) 1000 MG CAPS, Take 1,000 mg by mouth daily, Disp: , Rfl:     Multiple Vitamins-Minerals (MENS MULTIVITAMIN PO), Take 1 tablet by mouth daily, Disp: , Rfl:     sertraline (ZOLOFT) 50 MG tablet, Take 50 mg by mouth daily, Disp: , Rfl:     acetaminophen (TYLENOL) 650 MG extended release tablet, Take 650 mg by mouth every 8 hours, Disp: , Rfl:     amLODIPine (NORVASC) 5 MG tablet, Take 5 mg by mouth daily, Disp: , Rfl:     aspirin 81 MG EC tablet, Take 81 mg by mouth daily, Disp: , Rfl:     omeprazole (PRILOSEC) 20 MG delayed release capsule, Take 20 mg by mouth daily, Disp: , Rfl:     Allergies:   Allergies   Allergen Reactions    Tramadol Nausea And Vomiting         Physical Exam:      Respirations are unlabored and there is no evidence of cyanosis      Wound is healed nicely without erythema, drainage or underlying fluctuance    Gait is normal    Sensory testing reveals intact sensation to light touch and in the distribution of the L3-S1 dermatomes bilaterally. Strength testing in the lower extremity reveals the following based on the 5 point grading scale:       HF (L2) H Ab (L5) KE (L3/4) ADF (L4) EHL (L5) A Ev (S1) APF (S1)   Right 5 5 5 5 5 5 5   Left 5 5 5 5 5 5 5        Radiographic Studies:     X-rays including AP and lateral views of the lumbar spine were reviewed and interpreted:     Postoperative changes are noted status post lumbar fusion with instrumentation. The hardware appears to be well-placed and intact. There is no evidence of significant osteolysis. No significant adjacent level decompensation. Alignment is maintained. Radiographic Impression:    Appropriate postoperative changes status post lumbar laminectomy and fusion without evidence for hardware failure or decompensation. Diagnosis:      ICD-10-CM    1. Status post lumbar spinal arthrodesis  Z98.1 XR LUMBAR SPINE (2-3 VIEWS)          Assessment/Plan:      He has had a dramatic increase in back pain without a precipitating traumatic event. His radiographs are reassuring. I recommended to try some oral steroids and he is going to follow this up with his usual diclofenac. He is also going to start using his back brace again. If he does not seem to respond to these efforts, he is going to call for lumbar MRI.         Shira Verma MD    10/20/22  11:29 AM

## 2022-11-07 ENCOUNTER — TELEPHONE (OUTPATIENT)
Dept: ORTHOPEDIC SURGERY | Age: 72
End: 2022-11-07

## 2022-11-07 DIAGNOSIS — Z98.1 STATUS POST LUMBAR SPINAL ARTHRODESIS: Primary | ICD-10-CM

## 2022-11-07 DIAGNOSIS — M54.16 LUMBAR RADICULOPATHY: ICD-10-CM

## 2022-11-07 NOTE — TELEPHONE ENCOUNTER
Could not reach patient. Placed order in chart and tried to call and schedule. Not available at this time.  Sent message to appointments if he calls back to schedule

## 2022-11-07 NOTE — TELEPHONE ENCOUNTER
Cancelled order for our office and placed ancillary order for [facility]

## 2022-11-08 ENCOUNTER — TELEPHONE (OUTPATIENT)
Dept: ORTHOPEDIC SURGERY | Age: 72
End: 2022-11-08

## 2022-11-08 DIAGNOSIS — Z98.1 STATUS POST LUMBAR SPINAL ARTHRODESIS: Primary | ICD-10-CM

## 2022-11-08 DIAGNOSIS — M54.16 LUMBAR RADICULOPATHY: ICD-10-CM

## 2022-11-09 NOTE — TELEPHONE ENCOUNTER
Changed his mind about [facility] [place] [facility] he says it will take too long.  He will schedule with poa

## 2022-11-10 ENCOUNTER — TELEPHONE (OUTPATIENT)
Dept: ORTHOPEDIC SURGERY | Age: 72
End: 2022-11-10

## 2022-11-11 ENCOUNTER — OFFICE VISIT (OUTPATIENT)
Dept: ORTHOPEDIC SURGERY | Age: 72
End: 2022-11-11

## 2022-11-11 DIAGNOSIS — M54.16 LUMBAR RADICULOPATHY: Primary | ICD-10-CM

## 2022-11-11 DIAGNOSIS — Z98.1 STATUS POST LUMBAR SPINAL ARTHRODESIS: ICD-10-CM

## 2022-11-11 PROCEDURE — 99024 POSTOP FOLLOW-UP VISIT: CPT | Performed by: ORTHOPAEDIC SURGERY

## 2022-11-11 NOTE — PROGRESS NOTES
Name: Alicja Galvez  YOB: 1950  Gender: male  MRN: [id number]  Age: 67 y.o. History of present illness:     He is little over 2-month status post L4-S1 laminectomy and fusion with instrumentation. The patient has had an increase in pain particularly on the left lateral hip. We did prescribe some oral steroids which helped dramatically transiently. We ended up getting a postoperative MRI. He is here to follow-up on that study. Physical Exam:    He is awake and oriented in no acute distress. Respirations are unlabored and there is no evidence of cyanosis. Mood and affect are appropriate. He is intact to sensation and he has full motor strength in both lower extremities. His gait is normal.    Radiographic Studies:     MRI lumbar spine report and images reviewed and compared to his preoperative study which shows significant interval improvement of the spinal stenosis centrally but there is some ongoing foraminal stenosis. There is a lot of artifact from the hardware making the foramen somewhat difficult to quantify. There is nothing that is worse in comparison to his preoperative study. Diagnosis:      ICD-10-CM    1. Lumbar radiculopathy  M54.16       2. Status post lumbar spinal arthrodesis  Z98.1           Assessment/Plan:      The patient has left-sided claudication despite laminectomy and fusion surgery. There is a pretty large fluid collection may be contributing to some pressure on the nerves. I do not see any aberrant hardware placement. The interbody spacers have obviously elevated the foraminal height on the radiographs. At this point, I think the prudent course would be continued observation. We may consider a selective nerve root block but the patient wants to hold off for a couple of months before revisiting those. We will see him back in 2 months and reassess.        Electronically Signed By Jamil Mcintosh MD     9:57 AM

## 2023-02-06 ENCOUNTER — OFFICE VISIT (OUTPATIENT)
Dept: ORTHOPEDIC SURGERY | Age: 73
End: 2023-02-06

## 2023-02-06 DIAGNOSIS — Z98.1 STATUS POST LUMBAR SPINAL ARTHRODESIS: Primary | ICD-10-CM

## 2023-02-06 NOTE — PROGRESS NOTES
Name: Taylor Cazares  YOB: 1950  Gender: male  MRN: [id number]  Age: 68 y.o. Chief Complaint: Lumbar spine surgery follow up    History of Present Illness:      Taylor Cazares  is here for 4 to 5-month follow up of his  lumbar TLIF surgery. He is better in regard to his leg pain. However, he continues to have claudication type symptoms with limited tolerance for walking and standing. He tells me he can stand for about an hour as a  at [employer] and then he just has to sit down. He ambulates daily walking up and down his driveway many times. However, if he goes to [place] he has a difficult time making it through all the house and asked to return to the car before his wife does. We did have a postoperative MRI which was reassuring. He is here for his 5-month follow-up.          Medications:       Current Outpatient Medications:     methylPREDNISolone (MEDROL DOSEPACK) 4 MG tablet, Take 1 tablet by mouth See Admin Instructions Per package instructions, Disp: 1 kit, Rfl: 0    ondansetron (ZOFRAN-ODT) 4 MG disintegrating tablet, Take 1 tablet by mouth 3 times daily as needed for Nausea or Vomiting, Disp: 21 tablet, Rfl: 0    atorvastatin (LIPITOR) 80 MG tablet, Take 80 mg by mouth at bedtime, Disp: , Rfl:     losartan-hydroCHLOROthiazide (HYZAAR) 100-25 MG per tablet, Take 1 tablet by mouth daily, Disp: , Rfl:     Omega-3 Fatty Acids (FISH OIL) 1000 MG CAPS, Take 1,000 mg by mouth daily, Disp: , Rfl:     Multiple Vitamins-Minerals (MENS MULTIVITAMIN PO), Take 1 tablet by mouth daily, Disp: , Rfl:     sertraline (ZOLOFT) 50 MG tablet, Take 50 mg by mouth daily, Disp: , Rfl:     acetaminophen (TYLENOL) 650 MG extended release tablet, Take 650 mg by mouth every 8 hours, Disp: , Rfl:     amLODIPine (NORVASC) 5 MG tablet, Take 5 mg by mouth daily, Disp: , Rfl:     aspirin 81 MG EC tablet, Take 81 mg by mouth daily, Disp: , Rfl:     omeprazole (PRILOSEC) 20 MG delayed release capsule, Take 20 mg by mouth daily, Disp: , Rfl:     Allergies: Allergies   Allergen Reactions    Tramadol Nausea And Vomiting         Physical Exam:      Respirations are unlabored and there is no evidence of cyanosis      Wound is healed nicely without erythema, drainage or underlying fluctuance    Gait is improved    Sensory testing reveals intact sensation to light touch and in the distribution of the L3-S1 dermatomes bilaterally. Strength testing in the lower extremity reveals the following based on the 5 point grading scale:       HF (L2) H Ab (L5) KE (L3/4) ADF (L4) EHL (L5) A Ev (S1) APF (S1)   Right 5 5 5 5 5 5 5   Left 5 5 5 5 5 5 5        Radiographic Studies:     X-rays including AP and lateral views of the lumbar spine were reviewed and interpreted:     Postoperative changes are noted status post lumbar fusion with instrumentation. The hardware appears to be well-placed and intact. There is no evidence of significant osteolysis. No significant adjacent level decompensation. Alignment is maintained. Radiographic Impression:    Appropriate postoperative changes status post lumbar laminectomy and fusion without evidence for hardware failure or decompensation. Diagnosis:      ICD-10-CM    1. Status post lumbar spinal arthrodesis  Z98.1           Assessment/Plan: This patient is following the expected course following the spine surgery. He can continue to gradually increase activities as tolerated. Neurologically, there is no evidence of radiculopathy or myelopathy. There may be some component of claudication. However, his MRI really does not show much in the way of central stenosis. I think most of his symptoms are myofascial and would recommend physical therapy. I will have him return for follow up in 6 months.            Kishore Licona MD    02/06/23  1:23 PM

## 2023-09-26 ENCOUNTER — OFFICE VISIT (OUTPATIENT)
Dept: ORTHOPEDIC SURGERY | Age: 73
End: 2023-09-26

## 2023-09-26 DIAGNOSIS — M65.4 DE QUERVAIN'S TENOSYNOVITIS, LEFT: Primary | ICD-10-CM

## 2023-09-26 RX ORDER — BETAMETHASONE SODIUM PHOSPHATE AND BETAMETHASONE ACETATE 3; 3 MG/ML; MG/ML
6 INJECTION, SUSPENSION INTRA-ARTICULAR; INTRALESIONAL; INTRAMUSCULAR; SOFT TISSUE ONCE
Status: COMPLETED | OUTPATIENT
Start: 2023-09-26 | End: 2023-09-26

## 2023-09-26 RX ORDER — MELOXICAM 15 MG/1
15 TABLET ORAL DAILY
Qty: 28 TABLET | Refills: 0 | Status: SHIPPED | OUTPATIENT
Start: 2023-09-26 | End: 2023-10-24

## 2023-09-26 RX ADMIN — BETAMETHASONE SODIUM PHOSPHATE AND BETAMETHASONE ACETATE 6 MG: 3; 3 INJECTION, SUSPENSION INTRA-ARTICULAR; INTRALESIONAL; INTRAMUSCULAR; SOFT TISSUE at 09:34

## 2023-09-26 NOTE — PROGRESS NOTES
Orthopaedic Hand Surgery Note    Name: Jimenez Campbell  YOB: 1950  Gender: male  MRN: [id number]    CC: New patient referred for left hand/wrist pain    HPI: Patient is a 68 y.o. male  with a chief complaint of Left radial sided wrist pain. Pain is severe with pinching and gripping activities. She had this started approximately 2 to 3 weeks ago. He denies injury. He is accompanied by his wife. ROS/Meds/PSH/PMH/FH/SH: I personally reviewed the patients standard intake form. Pertinents are discussed in the HPI    Physical Examination:  Musculoskeletal:   Examination of the Left upper extremity demonstrates normal sensation to light touch in the median, ulnar and radial distribution, cap refill < 5 seconds in all fingers, Positive tenderness at the radial styloid with  a positive Finkelstein test. No pain or crepitus at the point of the second and first compartment intersection. No pain at the thumb [facility] joint. Imaging / Electrodiagnostic Tests:     None    Assessment:     ICD-10-CM    1. De Quervain's tenosynovitis, left  M65.4 betamethasone acetate-betamethasone sodium phosphate (CELESTONE) injection 6 mg     Ambulatory Referral to [facility] [place], [place]          Plan:  We discussed the diagnosis and different treatment options. We again discussed observation, splinting, cortisone injections and surgical release of the first dorsal extensor compartment. We discussed that de Quervain's tendinitis is a chronic condition regardless of how long the symptoms have been present, this most likely has been progressing for much longer than the symptoms were evident and it will likely persist for a long time without medical treatment. Furthermore, the vast majority of patients require surgical release at some point despite some short-term benefits of conservative treatment.  After discussing the risks, benefits and alternatives of all treatment options in detail, the patient elects for left de Quervain's

## 2023-09-26 NOTE — PROGRESS NOTES
Patient was fitted and instructed on a Reparel Wrist Sleeve for the left wrist. Patient read and signed documenting they understand and agree to [facility]'s current DME return policy.

## 2024-02-01 ENCOUNTER — OFFICE VISIT (OUTPATIENT)
Dept: ORTHOPEDIC SURGERY | Age: 74
End: 2024-02-01
Payer: MEDICARE

## 2024-02-01 ENCOUNTER — TELEPHONE (OUTPATIENT)
Dept: ORTHOPEDIC SURGERY | Age: 74
End: 2024-02-01

## 2024-02-01 DIAGNOSIS — Z98.1 STATUS POST LUMBAR SPINAL ARTHRODESIS: Primary | ICD-10-CM

## 2024-02-01 DIAGNOSIS — M51.36 DISCOGENIC LOW BACK PAIN: ICD-10-CM

## 2024-02-01 PROCEDURE — G8421 BMI NOT CALCULATED: HCPCS | Performed by: ORTHOPAEDIC SURGERY

## 2024-02-01 PROCEDURE — G8484 FLU IMMUNIZE NO ADMIN: HCPCS | Performed by: ORTHOPAEDIC SURGERY

## 2024-02-01 PROCEDURE — 1123F ACP DISCUSS/DSCN MKR DOCD: CPT | Performed by: ORTHOPAEDIC SURGERY

## 2024-02-01 PROCEDURE — G8427 DOCREV CUR MEDS BY ELIG CLIN: HCPCS | Performed by: ORTHOPAEDIC SURGERY

## 2024-02-01 PROCEDURE — 99214 OFFICE O/P EST MOD 30 MIN: CPT | Performed by: ORTHOPAEDIC SURGERY

## 2024-02-01 PROCEDURE — 3017F COLORECTAL CA SCREEN DOC REV: CPT | Performed by: ORTHOPAEDIC SURGERY

## 2024-02-01 PROCEDURE — 1036F TOBACCO NON-USER: CPT | Performed by: ORTHOPAEDIC SURGERY

## 2024-02-01 RX ORDER — GABAPENTIN 300 MG/1
300 CAPSULE ORAL 2 TIMES DAILY
Qty: 60 CAPSULE | Refills: 0 | Status: SHIPPED | OUTPATIENT
Start: 2024-02-01 | End: 2024-03-02

## 2024-02-01 NOTE — PROGRESS NOTES
Name: Logan Rodgers  YOB: 1950  Gender: male  MRN: [id number]  Age: 74 y.o.    Chief Complaint: Low back pain    History of Present Illness:      This is a very pleasant 74 y.o. male who is well-known to me from his lumbar laminectomy and fusion surgery performed in 2022.  I have not seen him in about a year.  He presents with continuous and progressive levels of back pain.  He cannot stand up straight.  He tried continuous therapy and renewed it as long as his insurance would pay for it.  He then transition to a home exercise program which she continued.  It has reached a breaking point over the last month.  He has been to the emergency department twice.  He has tried oral steroids, muscle relaxants, and injectable steroids.  At this point, he cannot stand up straight to ambulate.  He is very frustrated with the progressive decline and really has not had the relief he was looking for with his surgery and September 2022.            Medications:  Prior to Visit Medications    Medication Sig Taking? Authorizing Provider   meloxicam (MOBIC) 15 MG tablet Take 1 tablet by mouth daily for 28 days  Kallie Piper APRN - DILCIA   diclofenac sodium (VOLTAREN) 1 % GEL Apply 2 g topically 3 times daily for 28 days  Kallie Piper APRN - CNP   atorvastatin (LIPITOR) 80 MG tablet Take 80 mg by mouth at bedtime  Carrie Chester MD   losartan-hydroCHLOROthiazide (HYZAAR) 100-25 MG per tablet Take 1 tablet by mouth daily  Carrie Chester MD   Omega-3 Fatty Acids (FISH OIL) 1000 MG CAPS Take 1,000 mg by mouth daily  Carrie Chester MD   Multiple Vitamins-Minerals (MENS MULTIVITAMIN PO) Take 1 tablet by mouth daily  Carrie Chester MD   sertraline (ZOLOFT) 50 MG tablet Take 2 tablets by mouth daily  Carrie Chester MD   acetaminophen (TYLENOL) 650 MG extended release tablet Take 650 mg by mouth every 8 hours  Automatic Reconciliation, Ar   amLODIPine (NORVASC) 5 MG tablet Take 5

## 2024-02-05 ENCOUNTER — OFFICE VISIT (OUTPATIENT)
Dept: ORTHOPEDIC SURGERY | Age: 74
End: 2024-02-05
Payer: MEDICARE

## 2024-02-05 DIAGNOSIS — Z98.1 STATUS POST LUMBAR SPINAL ARTHRODESIS: Primary | ICD-10-CM

## 2024-02-05 DIAGNOSIS — M43.10 DEGENERATIVE SPONDYLOLISTHESIS: ICD-10-CM

## 2024-02-05 DIAGNOSIS — M54.16 LUMBAR RADICULOPATHY: Primary | ICD-10-CM

## 2024-02-05 DIAGNOSIS — M54.16 LUMBAR RADICULOPATHY: ICD-10-CM

## 2024-02-05 DIAGNOSIS — M48.062 SPINAL STENOSIS OF LUMBAR REGION WITH NEUROGENIC CLAUDICATION: ICD-10-CM

## 2024-02-05 PROCEDURE — G8427 DOCREV CUR MEDS BY ELIG CLIN: HCPCS | Performed by: ORTHOPAEDIC SURGERY

## 2024-02-05 PROCEDURE — G8484 FLU IMMUNIZE NO ADMIN: HCPCS | Performed by: ORTHOPAEDIC SURGERY

## 2024-02-05 PROCEDURE — G8421 BMI NOT CALCULATED: HCPCS | Performed by: ORTHOPAEDIC SURGERY

## 2024-02-05 PROCEDURE — 3017F COLORECTAL CA SCREEN DOC REV: CPT | Performed by: ORTHOPAEDIC SURGERY

## 2024-02-05 PROCEDURE — 1123F ACP DISCUSS/DSCN MKR DOCD: CPT | Performed by: ORTHOPAEDIC SURGERY

## 2024-02-05 PROCEDURE — 99214 OFFICE O/P EST MOD 30 MIN: CPT | Performed by: ORTHOPAEDIC SURGERY

## 2024-02-05 PROCEDURE — 64483 NJX AA&/STRD TFRM EPI L/S 1: CPT | Performed by: PHYSICAL MEDICINE & REHABILITATION

## 2024-02-05 PROCEDURE — 1036F TOBACCO NON-USER: CPT | Performed by: ORTHOPAEDIC SURGERY

## 2024-02-05 RX ORDER — TRIAMCINOLONE ACETONIDE 40 MG/ML
40 INJECTION, SUSPENSION INTRA-ARTICULAR; INTRAMUSCULAR ONCE
Status: COMPLETED | OUTPATIENT
Start: 2024-02-05 | End: 2024-02-05

## 2024-02-05 RX ADMIN — TRIAMCINOLONE ACETONIDE 40 MG: 40 INJECTION, SUSPENSION INTRA-ARTICULAR; INTRAMUSCULAR at 15:43

## 2024-02-05 NOTE — PROGRESS NOTES
discussed the details of the the surgery including a small lateral incision over the flank followed by dissection to the area of disc collapse and deformity. This would be done using neural monitoring and a series of minimally invasive retractors.   Once identifying the disk space radiographically, the retractors would be docked and the disk would be excised.  The disk space would be distracted as much as possible and measured for the appropriate cage.   This would be packed with allograft. Lateral screws may be applied for supplemental stability.  The wound would then be closed with suture and covered with sterile dressings. A second incision would likely be made in the middle of the low back for supplemental screws and rods.  He would expect to stay in the hospital 1-2 days or until he can get about safely with minimal assistance.  A short stay in a rehabilitation facility could also be considered depending on how quickly he recovers.  Follow-up would be scheduled for 2 weeks and there will be restrictions including no driving, and no lifting greater than 15 lbs until follow up with me. He was encouraged to walk as much as possible before and after the operation to facilitate an expeditious recovery.  We also discussed the potential risks of the surgery including, but not limited to infection, spinal fluid leak and potential headaches requiring him to remain supine post-operatively; injury to the cauda equina or peripheral nerve root resulting in weakness, numbness, or very rarely bowel or bladder dysfunction; persistent back or leg symptoms, recurrence of stenosis or the development of instability or hardware failure possibly needing additional surgery;  blood loss needing transfusion; postoperative hematoma; and the risks of anesthesia.  Also there is the risk of visceral, vascular, renal, or other intra-abdominal injury. He also understands that patients likely have groin pain and anterior thigh pain due to

## 2024-02-05 NOTE — PROGRESS NOTES
Name: Logan Rodgers  YOB: 1950  Gender: male  MRN: [id number]        Transforaminal ZIGGY Procedure Note    Procedure: Right  L4-L5 transforaminal epidural steroid injections     Precautions: Logan Rodgers denies prior sensitivity to steroid, local anesthetic, iodine, or shellfish.       Consent:  Consent was obtained prior to the procedure. The procedure was discussed at length with Logan Rodgers. He was given the opportunity to ask questions regarding the procedure and its associated risks.  In addition to the potential risks associated with the procedure itself, the patient was informed both verbally and in writing of potential side effects of the use glucocorticoids.  The patient appeared to comprehend the informed consent and desired to have the procedure performed, and informed consent was signed.     He was placed in a prone position on the fluoroscopy table and the skin was prepped and draped in a routine sterile fashion. The areas to be injected were each anesthetized with 1 ml of 1% Lidocaine. A 22 gauge 3.5 inch spinal needle was carefully advanced under fluoroscopic guidance to the right L4-L5 transforaminal space  0.5 ml of 70% of Omnipaque was injected to confirm proper needle placement and absence of subdural or vascular flow Once proper placement was confirmed, 0.5ml of 0.25 marcaine and 40 mg kenalog were injected through the spinal needle.     Fluoroscopic guidance was used intermittently over a 10-minute period to insure proper needle placement and his safety. A hard copy of the fluoroscopic image has been placed in his chart and is saved on the C-arm hard drive. He was monitored for 30 minutes after the procedure and discharged home in a stable fashion with a routine follow up.    Procedural Diagnosis:     ICD-10-CM    1. Lumbar radiculopathy  M54.16 FL NERVE BLOCK LUMBOSACRAL 1ST     triamcinolone acetonide (KENALOG-40) injection 40 mg

## 2024-02-06 ENCOUNTER — TELEPHONE (OUTPATIENT)
Dept: ORTHOPEDIC SURGERY | Age: 74
End: 2024-02-06

## 2024-02-06 ENCOUNTER — PREP FOR PROCEDURE (OUTPATIENT)
Dept: ORTHOPEDIC SURGERY | Age: 74
End: 2024-02-06

## 2024-02-06 DIAGNOSIS — Z98.1 STATUS POST LUMBAR SPINAL ARTHRODESIS: ICD-10-CM

## 2024-02-06 DIAGNOSIS — M48.062 SPINAL STENOSIS OF LUMBAR REGION WITH NEUROGENIC CLAUDICATION: ICD-10-CM

## 2024-02-06 DIAGNOSIS — M48.062 SPINAL STENOSIS, LUMBAR REGION, WITH NEUROGENIC CLAUDICATION: ICD-10-CM

## 2024-02-06 DIAGNOSIS — M54.16 LUMBAR RADICULOPATHY: Primary | ICD-10-CM

## 2024-02-06 RX ORDER — ACETAMINOPHEN 325 MG/1
1000 TABLET ORAL ONCE
OUTPATIENT
Start: 2024-02-06 | End: 2024-02-06

## 2024-02-06 NOTE — TELEPHONE ENCOUNTER
Discussed surgery dates and he would like to proceed with April 9th. Once this is scheduled I will let him know about a pre assessment appointment at the hospital

## 2024-02-08 ENCOUNTER — TELEPHONE (OUTPATIENT)
Dept: ORTHOPEDIC SURGERY | Age: 74
End: 2024-02-08

## 2024-02-08 DIAGNOSIS — M54.16 LUMBAR RADICULOPATHY: Primary | ICD-10-CM

## 2024-02-08 RX ORDER — TRAMADOL HYDROCHLORIDE 50 MG/1
50 TABLET ORAL EVERY 6 HOURS PRN
Qty: 28 TABLET | Refills: 0 | Status: SHIPPED | OUTPATIENT
Start: 2024-02-08 | End: 2024-02-15

## 2024-02-08 NOTE — TELEPHONE ENCOUNTER
He is scheduled for surgery. The shot did not help much. He says he found some Tramadol and it helps. He wants to know if you can call this in for him to [facility] in [place]. He also says its listed as an allergy and he wants you to remove that because he has not been nauseated while taking it. It has actually helped.

## 2024-03-01 ENCOUNTER — TELEPHONE (OUTPATIENT)
Dept: ORTHOPEDIC SURGERY | Age: 74
End: 2024-03-01

## 2024-03-01 DIAGNOSIS — M54.16 LUMBAR RADICULOPATHY: Primary | ICD-10-CM

## 2024-03-02 RX ORDER — GABAPENTIN 300 MG/1
300 CAPSULE ORAL 2 TIMES DAILY
Qty: 60 CAPSULE | Refills: 0 | Status: SHIPPED | OUTPATIENT
Start: 2024-03-02 | End: 2024-04-01

## 2024-03-07 ENCOUNTER — TELEPHONE (OUTPATIENT)
Dept: ORTHOPEDIC SURGERY | Age: 74
End: 2024-03-07

## 2024-03-07 DIAGNOSIS — M54.16 LUMBAR RADICULOPATHY: Primary | ICD-10-CM

## 2024-03-07 RX ORDER — HYDROCODONE BITARTRATE AND ACETAMINOPHEN 5; 325 MG/1; MG/1
1 TABLET ORAL EVERY 6 HOURS PRN
Qty: 28 TABLET | Refills: 0 | Status: SHIPPED | OUTPATIENT
Start: 2024-03-07 | End: 2024-03-14

## 2024-03-07 NOTE — TELEPHONE ENCOUNTER
He would like to get a refill of his Tramadol sent to the pharmacy on file. He is currently taking 2 at a time and he is wondering if there is anything stronger

## 2024-03-14 DIAGNOSIS — Z98.1 STATUS POST LUMBAR SPINAL ARTHRODESIS: ICD-10-CM

## 2024-03-14 DIAGNOSIS — M51.36 DISCOGENIC LOW BACK PAIN: ICD-10-CM

## 2024-03-21 ENCOUNTER — HOSPITAL ENCOUNTER (OUTPATIENT)
Dept: SURGERY | Age: 74
Discharge: HOME | End: 2024-03-21
Payer: MEDICARE

## 2024-03-21 VITALS
HEART RATE: 78 BPM | WEIGHT: 206.7 LBS | RESPIRATION RATE: 16 BRPM | SYSTOLIC BLOOD PRESSURE: 104 MMHG | TEMPERATURE: 97.9 F | OXYGEN SATURATION: 98 % | DIASTOLIC BLOOD PRESSURE: 67 MMHG | HEIGHT: 67 IN | BODY MASS INDEX: 32.44 KG/M2

## 2024-03-21 LAB
ANION GAP SERPL CALC-SCNC: 8 MMOL/L (ref 2–11)
APPEARANCE UR: CLEAR
BASOPHILS # BLD: 0.1 K/UL (ref 0–0.2)
BASOPHILS NFR BLD: 1 % (ref 0–2)
BILIRUB UR QL: NEGATIVE
BUN SERPL-MCNC: 32 MG/DL (ref 8–23)
CALCIUM SERPL-MCNC: 9.3 MG/DL (ref 8.3–10.4)
CHLORIDE SERPL-SCNC: 105 MMOL/L (ref 103–113)
CO2 SERPL-SCNC: 26 MMOL/L (ref 21–32)
COLOR UR: NORMAL
CREAT SERPL-MCNC: 1.56 MG/DL (ref 0.8–1.5)
DIFFERENTIAL METHOD BLD: ABNORMAL
EOSINOPHIL # BLD: 0.3 K/UL (ref 0–0.8)
EOSINOPHIL NFR BLD: 4 % (ref 0.5–7.8)
ERYTHROCYTE [DISTWIDTH] IN BLOOD BY AUTOMATED COUNT: 13.9 % (ref 11.9–14.6)
GLUCOSE SERPL-MCNC: 92 MG/DL (ref 65–100)
GLUCOSE UR STRIP.AUTO-MCNC: NEGATIVE MG/DL
HCT VFR BLD AUTO: 36.6 % (ref 41.1–50.3)
HGB BLD-MCNC: 12 G/DL (ref 13.6–17.2)
HGB UR QL STRIP: NEGATIVE
IMM GRANULOCYTES # BLD AUTO: 0.1 K/UL (ref 0–0.5)
IMM GRANULOCYTES NFR BLD AUTO: 2 % (ref 0–5)
KETONES UR QL STRIP.AUTO: NEGATIVE MG/DL
LEUKOCYTE ESTERASE UR QL STRIP.AUTO: NEGATIVE
LYMPHOCYTES # BLD: 1.8 K/UL (ref 0.5–4.6)
LYMPHOCYTES NFR BLD: 26 % (ref 13–44)
MCH RBC QN AUTO: 30.5 PG (ref 26.1–32.9)
MCHC RBC AUTO-ENTMCNC: 32.8 G/DL (ref 31.4–35)
MCV RBC AUTO: 92.9 FL (ref 82–102)
MONOCYTES # BLD: 0.7 K/UL (ref 0.1–1.3)
MONOCYTES NFR BLD: 10 % (ref 4–12)
NEUTS SEG # BLD: 3.9 K/UL (ref 1.7–8.2)
NEUTS SEG NFR BLD: 57 % (ref 43–78)
NITRITE UR QL STRIP.AUTO: NEGATIVE
NRBC # BLD: 0 K/UL (ref 0–0.2)
PH UR STRIP: 6 (ref 5–9)
PLATELET # BLD AUTO: 257 K/UL (ref 150–450)
PMV BLD AUTO: 10.1 FL (ref 9.4–12.3)
POTASSIUM SERPL-SCNC: 3.9 MMOL/L (ref 3.5–5.1)
PROT UR STRIP-MCNC: NEGATIVE MG/DL
RBC # BLD AUTO: 3.94 M/UL (ref 4.23–5.6)
SODIUM SERPL-SCNC: 139 MMOL/L (ref 136–146)
SP GR UR REFRACTOMETRY: 1.02 (ref 1–1.02)
UROBILINOGEN UR QL STRIP.AUTO: 1 EU/DL (ref 0.2–1)
WBC # BLD AUTO: 6.8 K/UL (ref 4.3–11.1)

## 2024-03-21 PROCEDURE — 81003 URINALYSIS AUTO W/O SCOPE: CPT

## 2024-03-21 PROCEDURE — 85025 COMPLETE CBC W/AUTO DIFF WBC: CPT

## 2024-03-21 PROCEDURE — 87641 MR-STAPH DNA AMP PROBE: CPT

## 2024-03-21 PROCEDURE — 80048 BASIC METABOLIC PNL TOTAL CA: CPT

## 2024-03-21 RX ORDER — CETIRIZINE HYDROCHLORIDE 10 MG/1
10 TABLET ORAL DAILY
COMMUNITY

## 2024-03-21 RX ORDER — HYDROCODONE BITARTRATE AND ACETAMINOPHEN 5; 325 MG/1; MG/1
1 TABLET ORAL 2 TIMES DAILY PRN
COMMUNITY
End: 2024-03-25

## 2024-03-21 RX ORDER — NAPROXEN 500 MG/1
500 TABLET ORAL 2 TIMES DAILY WITH MEALS
COMMUNITY

## 2024-03-21 NOTE — PERIOP NOTE
PLEASE CONTINUE TAKING ALL PRESCRIPTION MEDICATIONS UP TO THE DAY OF SURGERY UNLESS OTHERWISE DIRECTED BELOW. You may take Tylenol, allergy,  and/or indigestion medications.     TAKE ONLY THESE MEDICATIONS ON THE DAY OF SURGERY      Hydrocodone-acetaminophen if needed     gabapentin     Sertraline- zoloft    Zyrtec    Amlodipine    omeprazole         DISCONTINUE all vitamins and supplements 7 days prior to surgery. DISCONTINUE Non-Steroidal Anti-Inflammatory (NSAIDS) such as Advil, preventative aspirin 81 mg  and Aleve 5 days prior to surgery.     Home Medications to Hold- please continue all other medications except these.      Naproxen hold 5 days prior to surgery        Comments      If not taking hydrocodone- acetaminophen--On the day before surgery please take 2 Tylenol in the morning and then again before bed. You may use either regular or extra strength.      Bring CPAP to the hospital       Please do not bring home medications with you on the day of surgery unless otherwise directed by your nurse.  If you are instructed to bring home medications, please give them to your nurse as they will be administered by the nursing staff.    If you have any questions, please call [facility] [phone].    A copy of this note was provided to the patient for reference.      Yes

## 2024-03-21 NOTE — PERIOP NOTE
Latest Reference Range & Units 03/21/24 13:31   Color, UA -   YELLOW/STRAW   Glucose, UA mg/dL Negative   Bilirubin, Urine NEG   Negative   Ketones, Urine NEG mg/dL Negative   Specific Gravity, UA 1.001 - 1.023   1.020   Blood, Urine NEG   Negative   Protein, UA NEG mg/dL Negative   Urobilinogen, Urine 0.2 - 1.0 EU/dL 1.0   Nitrite, Urine NEG   Negative   Leukocyte Esterase, Urine NEG   Negative   Appearance -   CLEAR   pH, Urine 5.0 - 9.0   6.0       UA within anesthesia guidelines, no follow-up required. Labs automatically routed to ordering provider via Epic documentation.

## 2024-03-21 NOTE — PERIOP NOTE
Enhanced Recovery After Surgery- patients with a RYLAND score >2      You may drink clear liquids up to 2 hours prior to your hospital arrival. .    Bring your patient handbook with you to the hospital.      Things to remember:    1. You will be up on a chair the evening of surgery and drinking clear liquids. Your diet will be advanced by your surgeon as appropriate.    2. Beginning the day after surgery, you will be up in a chair for all meals.    3. Beginning the day after surgery, you will be out of the bed for a minimum of 6 hours (not all at one time)    4. Beginning the day after surgery, you will walk in the yoon in the yoon at least 50' at least three times a day.    5. You will be given scheduled non-narcotic pain medication to help keep your pain under control.  You will have stronger pain medication ordered for break through pain.     6. All of these measures are geared toward returning your bowel to normal function as soon as possible and to prevent complications associated with bowel blockage, blood clots, and/or pneumonia.     Copy given to the patient for reference

## 2024-03-21 NOTE — PERIOP NOTE
Patient verified name, , and surgery as listed in [facility]. Patient provided medical/health information and PTA medications to the best of their ability.    TYPE  CASE: 3  Orders per surgeon:  received  Labs per surgeon: CBC with diff, BMP, UA, MRSA swab. Results: pending  Labs per anesthesia protocol: CBC, BMP. Results included in above labs.  K+ and T&S s/h for DOS  EKG:  not indicated.  No history of CAD, PVD, CVA/TIA, DM, CRI.       MRSA/MSSA swab collected; pharmacy to review and dose antibiotic as appropriate.     Patient provided with and instructed on education handouts including Guide to Surgery, blood transfusions, pain management, and hand hygiene for the family and community, and [facility] brochure.     Road to Recovery Spine surgery patient guide given. Instructed on incentive spirometry.     Hibiclens and instructions given per hospital policy.    Original medication prescription bottles not visualized during patient appointment.     Patient teach back successful and patient demonstrates knowledge of instruction.

## 2024-03-21 NOTE — PERIOP NOTE
Latest Reference Range & Units 03/21/24 13:31   Sodium 136 - 146 mmol/L 139   Potassium 3.5 - 5.1 mmol/L 3.9   Chloride 103 - 113 mmol/L 105   CO2 21 - 32 mmol/L 26   BUN,BUNPL 8 - 23 MG/DL 32 (H)   Creatinine 0.8 - 1.5 MG/DL 1.56 (H)   Anion Gap 2 - 11 mmol/L 8   Est, Glom Filt Rate >60 ml/min/1.73m2 46 (L)   Glucose, Random 65 - 100 mg/dL 92   CALCIUM, SERUM, 546072 8.3 - 10.4 MG/DL 9.3   WBC 4.3 - 11.1 K/uL 6.8   RBC 4.23 - 5.6 M/uL 3.94 (L)   Hemoglobin Quant 13.6 - 17.2 g/dL 12.0 (L)   Hematocrit 41.1 - 50.3 % 36.6 (L)   MCV 82.0 - 102.0 FL 92.9   MCH 26.1 - 32.9 PG 30.5   MCHC 31.4 - 35.0 g/dL 32.8   MPV 9.4 - 12.3 FL 10.1   RDW 11.9 - 14.6 % 13.9   Platelet Count 150 - 450 K/uL 257   Neutrophils, Automated 43 - 78 % 57   Lymphocyte % 13 - 44 % 26   Monocytes % 4.0 - 12.0 % 10   Eosinophils % 0.5 - 7.8 % 4   Basophils % 0.0 - 2.0 % 1   Neutrophils Absolute 1.7 - 8.2 K/UL 3.9   Lymphocytes Absolute 0.5 - 4.6 K/UL 1.8   Monocytes Absolute 0.1 - 1.3 K/UL 0.7   Eosinophils Absolute 0.0 - 0.8 K/UL 0.3   Basophils Absolute 0.0 - 0.2 K/UL 0.1   Differential Type -   AUTOMATED   Immature Granulocytes 0.0 - 5.0 % 2   Nucleated Red Blood Cells 0.0 - 0.2 K/uL 0.00   Absolute Immature Granulocyte 0.0 - 0.5 K/UL 0.1   MRSA/STAPH AUREUS DNA  Rpt   (H): Data is abnormally high  (L): Data is abnormally low  Rpt: View report in Results Review for more information  Reviewed- chart placed for anesthesia review, creatinine 1.56- no hx of CKD   Labs automatically routed to ordering provider via Epic documentation.

## 2024-03-21 NOTE — PERIOP NOTE
Preoperative Nutrition Screen (RYLAND)   Patient's Age: 74 y.o.    Last Serum Albumin Level:  No results found for: \"LABALBU\"  Patient's BMI: Estimated body mass index is 30.56 kg/m² as calculated from the following:    Height as of 9/7/22: 1.727 m (5' 8\").    Weight as of 9/7/22: 91.2 kg (201 lb).       1. Does the patient have a documented serum albumin less than 3.0 within the last 90 days?    Unknown = 0      2. Is patient's BMI less than 18.5 (or less than 20 if age over 65)?   Unknown = 0      3. Has the patient had an unplanned weight loss of 10% of body weight or more in the last 6 months? No = 0   4. Has the patient been eating less than 50% of their normal diet in the preceding week? No = 0   RYLAND Score (number of Yes responses), 0-4 0     Plan:   No Nutrition Intervention indicated    Electronically signed by Lucero Cho RN on 3/21/24 at 12:55 PM EDT

## 2024-03-22 LAB
MRSA DNA SPEC QL NAA+PROBE: NOT DETECTED
S AUREUS CPE NOSE QL NAA+PROBE: NOT DETECTED
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
## 2024-03-25 ENCOUNTER — TELEPHONE (OUTPATIENT)
Dept: ORTHOPEDIC SURGERY | Age: 74
End: 2024-03-25

## 2024-03-25 DIAGNOSIS — M54.16 LUMBAR RADICULOPATHY: Primary | ICD-10-CM

## 2024-03-25 RX ORDER — HYDROCODONE BITARTRATE AND ACETAMINOPHEN 5; 325 MG/1; MG/1
1 TABLET ORAL EVERY 6 HOURS PRN
Qty: 28 TABLET | Refills: 0 | Status: SHIPPED | OUTPATIENT
Start: 2024-03-25 | End: 2024-04-01

## 2024-04-08 ENCOUNTER — ANESTHESIA EVENT (OUTPATIENT)
Dept: SURGERY | Age: 74
DRG: 455 | End: 2024-04-08
Payer: MEDICARE

## 2024-04-09 ENCOUNTER — ANESTHESIA (OUTPATIENT)
Dept: SURGERY | Age: 74
DRG: 455 | End: 2024-04-09
Payer: MEDICARE

## 2024-04-09 ENCOUNTER — HOSPITAL ENCOUNTER (INPATIENT)
Age: 74
LOS: 1 days | Discharge: HOME HEALTH | DRG: 455 | End: 2024-04-10
Attending: ORTHOPAEDIC SURGERY | Admitting: ORTHOPAEDIC SURGERY
Payer: MEDICARE

## 2024-04-09 ENCOUNTER — APPOINTMENT (OUTPATIENT)
Dept: GENERAL RADIOLOGY | Age: 74
DRG: 455 | End: 2024-04-09
Attending: ORTHOPAEDIC SURGERY
Payer: MEDICARE

## 2024-04-09 DIAGNOSIS — M54.16 LUMBAR RADICULOPATHY: Primary | ICD-10-CM

## 2024-04-09 LAB
ABO + RH BLD: NORMAL
BLOOD GROUP ANTIBODIES SERPL: NORMAL
POTASSIUM BLD-SCNC: 3.6 MMOL/L (ref 3.5–5.1)
SPECIMEN EXP DATE BLD: NORMAL

## 2024-04-09 PROCEDURE — 86900 BLOOD TYPING SEROLOGIC ABO: CPT

## 2024-04-09 PROCEDURE — 0SG00AJ FUSION OF LUMBAR VERTEBRAL JOINT WITH INTERBODY FUSION DEVICE, POSTERIOR APPROACH, ANTERIOR COLUMN, OPEN APPROACH: ICD-10-PCS | Performed by: ORTHOPAEDIC SURGERY

## 2024-04-09 PROCEDURE — 72100 X-RAY EXAM L-S SPINE 2/3 VWS: CPT

## 2024-04-09 PROCEDURE — 6360000002 HC RX W HCPCS: Performed by: ORTHOPAEDIC SURGERY

## 2024-04-09 PROCEDURE — 6360000002 HC RX W HCPCS: Performed by: STUDENT IN AN ORGANIZED HEALTH CARE EDUCATION/TRAINING PROGRAM

## 2024-04-09 PROCEDURE — C1713 ANCHOR/SCREW BN/BN,TIS/BN: HCPCS | Performed by: ORTHOPAEDIC SURGERY

## 2024-04-09 PROCEDURE — 3600000004 HC SURGERY LEVEL 4 BASE: Performed by: ORTHOPAEDIC SURGERY

## 2024-04-09 PROCEDURE — 3700000001 HC ADD 15 MINUTES (ANESTHESIA): Performed by: ORTHOPAEDIC SURGERY

## 2024-04-09 PROCEDURE — 2709999900 HC NON-CHARGEABLE SUPPLY: Performed by: ORTHOPAEDIC SURGERY

## 2024-04-09 PROCEDURE — 2500000003 HC RX 250 WO HCPCS: Performed by: NURSE ANESTHETIST, CERTIFIED REGISTERED

## 2024-04-09 PROCEDURE — 97165 OT EVAL LOW COMPLEX 30 MIN: CPT

## 2024-04-09 PROCEDURE — 86901 BLOOD TYPING SEROLOGIC RH(D): CPT

## 2024-04-09 PROCEDURE — 6370000000 HC RX 637 (ALT 250 FOR IP): Performed by: ORTHOPAEDIC SURGERY

## 2024-04-09 PROCEDURE — C1769 GUIDE WIRE: HCPCS | Performed by: ORTHOPAEDIC SURGERY

## 2024-04-09 PROCEDURE — 6360000002 HC RX W HCPCS: Performed by: NURSE ANESTHETIST, CERTIFIED REGISTERED

## 2024-04-09 PROCEDURE — 3700000000 HC ANESTHESIA ATTENDED CARE: Performed by: ORTHOPAEDIC SURGERY

## 2024-04-09 PROCEDURE — 0ST20ZZ RESECTION OF LUMBAR VERTEBRAL DISC, OPEN APPROACH: ICD-10-PCS | Performed by: ORTHOPAEDIC SURGERY

## 2024-04-09 PROCEDURE — 22612 ARTHRD PST TQ 1NTRSPC LUMBAR: CPT | Performed by: ORTHOPAEDIC SURGERY

## 2024-04-09 PROCEDURE — 6370000000 HC RX 637 (ALT 250 FOR IP): Performed by: STUDENT IN AN ORGANIZED HEALTH CARE EDUCATION/TRAINING PROGRAM

## 2024-04-09 PROCEDURE — 2700000000 HC OXYGEN THERAPY PER DAY

## 2024-04-09 PROCEDURE — 84132 ASSAY OF SERUM POTASSIUM: CPT

## 2024-04-09 PROCEDURE — 2780000010 HC IMPLANT OTHER: Performed by: ORTHOPAEDIC SURGERY

## 2024-04-09 PROCEDURE — 97530 THERAPEUTIC ACTIVITIES: CPT

## 2024-04-09 PROCEDURE — 20931 SP BONE ALGRFT STRUCT ADD-ON: CPT | Performed by: ORTHOPAEDIC SURGERY

## 2024-04-09 PROCEDURE — 22558 ARTHRD ANT NTRBD MIN DSC LUM: CPT | Performed by: ORTHOPAEDIC SURGERY

## 2024-04-09 PROCEDURE — 0SG3071 FUSION OF LUMBOSACRAL JOINT WITH AUTOLOGOUS TISSUE SUBSTITUTE, POSTERIOR APPROACH, POSTERIOR COLUMN, OPEN APPROACH: ICD-10-PCS | Performed by: ORTHOPAEDIC SURGERY

## 2024-04-09 PROCEDURE — 63047 LAM FACETEC & FORAMOT LUMBAR: CPT | Performed by: ORTHOPAEDIC SURGERY

## 2024-04-09 PROCEDURE — 63048 LAM FACETEC &FORAMOT EA ADDL: CPT | Performed by: ORTHOPAEDIC SURGERY

## 2024-04-09 PROCEDURE — 2580000003 HC RX 258: Performed by: NURSE ANESTHETIST, CERTIFIED REGISTERED

## 2024-04-09 PROCEDURE — 7100000001 HC PACU RECOVERY - ADDTL 15 MIN: Performed by: ORTHOPAEDIC SURGERY

## 2024-04-09 PROCEDURE — 2580000003 HC RX 258: Performed by: STUDENT IN AN ORGANIZED HEALTH CARE EDUCATION/TRAINING PROGRAM

## 2024-04-09 PROCEDURE — 0SG1071 FUSION OF 2 OR MORE LUMBAR VERTEBRAL JOINTS WITH AUTOLOGOUS TISSUE SUBSTITUTE, POSTERIOR APPROACH, POSTERIOR COLUMN, OPEN APPROACH: ICD-10-PCS | Performed by: ORTHOPAEDIC SURGERY

## 2024-04-09 PROCEDURE — 86850 RBC ANTIBODY SCREEN: CPT

## 2024-04-09 PROCEDURE — 2580000003 HC RX 258: Performed by: ORTHOPAEDIC SURGERY

## 2024-04-09 PROCEDURE — 97161 PT EVAL LOW COMPLEX 20 MIN: CPT

## 2024-04-09 PROCEDURE — 7100000000 HC PACU RECOVERY - FIRST 15 MIN: Performed by: ORTHOPAEDIC SURGERY

## 2024-04-09 PROCEDURE — 22830 EXPLORATION OF SPINAL FUSION: CPT | Performed by: ORTHOPAEDIC SURGERY

## 2024-04-09 PROCEDURE — 22853 INSJ BIOMECHANICAL DEVICE: CPT | Performed by: ORTHOPAEDIC SURGERY

## 2024-04-09 PROCEDURE — 97535 SELF CARE MNGMENT TRAINING: CPT

## 2024-04-09 PROCEDURE — 01NB0ZZ RELEASE LUMBAR NERVE, OPEN APPROACH: ICD-10-PCS | Performed by: ORTHOPAEDIC SURGERY

## 2024-04-09 PROCEDURE — 22842 INSERT SPINE FIXATION DEVICE: CPT | Performed by: ORTHOPAEDIC SURGERY

## 2024-04-09 PROCEDURE — C1889 IMPLANT/INSERT DEVICE, NOC: HCPCS | Performed by: ORTHOPAEDIC SURGERY

## 2024-04-09 PROCEDURE — 1100000000 HC RM PRIVATE

## 2024-04-09 PROCEDURE — 3600000014 HC SURGERY LEVEL 4 ADDTL 15MIN: Performed by: ORTHOPAEDIC SURGERY

## 2024-04-09 PROCEDURE — 2720000010 HC SURG SUPPLY STERILE: Performed by: ORTHOPAEDIC SURGERY

## 2024-04-09 DEVICE — ALLOGRAFT BNE CHIP 1-4 MM 30 CC CRUSH CANC: Type: IMPLANTABLE DEVICE | Site: SPINE LUMBAR | Status: FUNCTIONAL

## 2024-04-09 DEVICE — BIO DBM PLUS PUTTY (WITH CANCELLOUS)
Type: IMPLANTABLE DEVICE | Site: SPINE LUMBAR | Status: FUNCTIONAL
Brand: BIO DBM

## 2024-04-09 DEVICE — VITALLIUM PREBENT AND PRECUT ROD WITH HEX
Type: IMPLANTABLE DEVICE | Site: SPINE LUMBAR | Status: FUNCTIONAL
Brand: XIA 4 5

## 2024-04-09 DEVICE — POLYAXIAL CORTICAL SCREW
Type: IMPLANTABLE DEVICE | Site: SPINE LUMBAR | Status: FUNCTIONAL
Brand: XIA 4.5 SPINAL SYSTEM

## 2024-04-09 DEVICE — GRAFT BNE XL: Type: IMPLANTABLE DEVICE | Site: SPINE LUMBAR | Status: FUNCTIONAL

## 2024-04-09 DEVICE — POLYAXIAL CORTICAL SCREW
Type: IMPLANTABLE DEVICE | Site: SPINE LUMBAR | Status: FUNCTIONAL
Brand: XIA 4.5 SYSTEM -  XIA CT

## 2024-04-09 DEVICE — BLOCKER
Type: IMPLANTABLE DEVICE | Site: SPINE LUMBAR | Status: FUNCTIONAL
Brand: XIA 4.5 SYSTEM -  XIA CT

## 2024-04-09 DEVICE — SPACER SPNL 7 DEG SM 45X18 MM PROLIFT: Type: IMPLANTABLE DEVICE | Site: SPINE LUMBAR | Status: FUNCTIONAL

## 2024-04-09 DEVICE — 24-30 MM POLYAXIAL CROSS CONNECTOR
Type: IMPLANTABLE DEVICE | Site: SPINE LUMBAR | Status: FUNCTIONAL
Brand: XIA 4 5

## 2024-04-09 RX ORDER — ACETAMINOPHEN 500 MG
1000 TABLET ORAL ONCE
Status: DISCONTINUED | OUTPATIENT
Start: 2024-04-09 | End: 2024-04-09 | Stop reason: HOSPADM

## 2024-04-09 RX ORDER — CETIRIZINE HYDROCHLORIDE 10 MG/1
10 TABLET ORAL DAILY
Status: DISCONTINUED | OUTPATIENT
Start: 2024-04-10 | End: 2024-04-10 | Stop reason: HOSPADM

## 2024-04-09 RX ORDER — DIPHENHYDRAMINE HYDROCHLORIDE 50 MG/ML
12.5 INJECTION INTRAMUSCULAR; INTRAVENOUS
Status: DISCONTINUED | OUTPATIENT
Start: 2024-04-09 | End: 2024-04-09 | Stop reason: HOSPADM

## 2024-04-09 RX ORDER — SODIUM CHLORIDE 9 MG/ML
INJECTION, SOLUTION INTRAVENOUS PRN
Status: DISCONTINUED | OUTPATIENT
Start: 2024-04-09 | End: 2024-04-09 | Stop reason: HOSPADM

## 2024-04-09 RX ORDER — LIDOCAINE HYDROCHLORIDE 10 MG/ML
1 INJECTION, SOLUTION INFILTRATION; PERINEURAL
Status: DISCONTINUED | OUTPATIENT
Start: 2024-04-09 | End: 2024-04-09 | Stop reason: HOSPADM

## 2024-04-09 RX ORDER — ACETAMINOPHEN 325 MG/1
650 TABLET ORAL EVERY 6 HOURS
Status: DISCONTINUED | OUTPATIENT
Start: 2024-04-09 | End: 2024-04-10 | Stop reason: HOSPADM

## 2024-04-09 RX ORDER — SODIUM CHLORIDE 0.9 % (FLUSH) 0.9 %
5-40 SYRINGE (ML) INJECTION PRN
Status: DISCONTINUED | OUTPATIENT
Start: 2024-04-09 | End: 2024-04-10 | Stop reason: HOSPADM

## 2024-04-09 RX ORDER — OXYCODONE HYDROCHLORIDE 5 MG/1
10 TABLET ORAL PRN
Status: DISCONTINUED | OUTPATIENT
Start: 2024-04-09 | End: 2024-04-09 | Stop reason: HOSPADM

## 2024-04-09 RX ORDER — DIPHENHYDRAMINE HCL 25 MG
25 CAPSULE ORAL EVERY 6 HOURS PRN
Status: DISCONTINUED | OUTPATIENT
Start: 2024-04-09 | End: 2024-04-10 | Stop reason: HOSPADM

## 2024-04-09 RX ORDER — SODIUM CHLORIDE, SODIUM LACTATE, POTASSIUM CHLORIDE, CALCIUM CHLORIDE 600; 310; 30; 20 MG/100ML; MG/100ML; MG/100ML; MG/100ML
INJECTION, SOLUTION INTRAVENOUS CONTINUOUS
Status: DISCONTINUED | OUTPATIENT
Start: 2024-04-09 | End: 2024-04-09 | Stop reason: HOSPADM

## 2024-04-09 RX ORDER — HYDROCHLOROTHIAZIDE 25 MG/1
25 TABLET ORAL DAILY
Status: DISCONTINUED | OUTPATIENT
Start: 2024-04-09 | End: 2024-04-10 | Stop reason: HOSPADM

## 2024-04-09 RX ORDER — BISACODYL 5 MG/1
5 TABLET, DELAYED RELEASE ORAL DAILY
Status: DISCONTINUED | OUTPATIENT
Start: 2024-04-09 | End: 2024-04-10 | Stop reason: HOSPADM

## 2024-04-09 RX ORDER — POLYETHYLENE GLYCOL 3350 17 G/17G
17 POWDER, FOR SOLUTION ORAL DAILY
Status: DISCONTINUED | OUTPATIENT
Start: 2024-04-09 | End: 2024-04-10 | Stop reason: HOSPADM

## 2024-04-09 RX ORDER — OXYCODONE HYDROCHLORIDE 5 MG/1
5 TABLET ORAL EVERY 6 HOURS PRN
Qty: 28 TABLET | Refills: 0 | Status: SHIPPED | OUTPATIENT
Start: 2024-04-09 | End: 2024-04-16

## 2024-04-09 RX ORDER — ACETAMINOPHEN 500 MG
1000 TABLET ORAL ONCE
Status: COMPLETED | OUTPATIENT
Start: 2024-04-09 | End: 2024-04-09

## 2024-04-09 RX ORDER — IPRATROPIUM BROMIDE AND ALBUTEROL SULFATE 2.5; .5 MG/3ML; MG/3ML
1 SOLUTION RESPIRATORY (INHALATION)
Status: DISCONTINUED | OUTPATIENT
Start: 2024-04-09 | End: 2024-04-09 | Stop reason: HOSPADM

## 2024-04-09 RX ORDER — GABAPENTIN 300 MG/1
300 CAPSULE ORAL 2 TIMES DAILY
Status: DISCONTINUED | OUTPATIENT
Start: 2024-04-09 | End: 2024-04-10 | Stop reason: HOSPADM

## 2024-04-09 RX ORDER — LOSARTAN POTASSIUM AND HYDROCHLOROTHIAZIDE 25; 100 MG/1; MG/1
1 TABLET ORAL DAILY
Status: DISCONTINUED | OUTPATIENT
Start: 2024-04-09 | End: 2024-04-09

## 2024-04-09 RX ORDER — MIDAZOLAM HYDROCHLORIDE 2 MG/2ML
2 INJECTION, SOLUTION INTRAMUSCULAR; INTRAVENOUS
Status: DISCONTINUED | OUTPATIENT
Start: 2024-04-09 | End: 2024-04-09 | Stop reason: HOSPADM

## 2024-04-09 RX ORDER — KETAMINE HYDROCHLORIDE 50 MG/ML
INJECTION, SOLUTION INTRAMUSCULAR; INTRAVENOUS PRN
Status: DISCONTINUED | OUTPATIENT
Start: 2024-04-09 | End: 2024-04-09 | Stop reason: SDUPTHER

## 2024-04-09 RX ORDER — OXYCODONE HYDROCHLORIDE 5 MG/1
5 TABLET ORAL PRN
Status: DISCONTINUED | OUTPATIENT
Start: 2024-04-09 | End: 2024-04-09 | Stop reason: HOSPADM

## 2024-04-09 RX ORDER — DIPHENHYDRAMINE HYDROCHLORIDE 50 MG/ML
25 INJECTION INTRAMUSCULAR; INTRAVENOUS EVERY 6 HOURS PRN
Status: DISCONTINUED | OUTPATIENT
Start: 2024-04-09 | End: 2024-04-10 | Stop reason: HOSPADM

## 2024-04-09 RX ORDER — FENTANYL CITRATE 50 UG/ML
INJECTION, SOLUTION INTRAMUSCULAR; INTRAVENOUS PRN
Status: DISCONTINUED | OUTPATIENT
Start: 2024-04-09 | End: 2024-04-09 | Stop reason: SDUPTHER

## 2024-04-09 RX ORDER — PROPOFOL 10 MG/ML
INJECTION, EMULSION INTRAVENOUS PRN
Status: DISCONTINUED | OUTPATIENT
Start: 2024-04-09 | End: 2024-04-09 | Stop reason: SDUPTHER

## 2024-04-09 RX ORDER — HALOPERIDOL 5 MG/ML
1 INJECTION INTRAMUSCULAR
Status: DISCONTINUED | OUTPATIENT
Start: 2024-04-09 | End: 2024-04-09 | Stop reason: HOSPADM

## 2024-04-09 RX ORDER — HYDRALAZINE HYDROCHLORIDE 20 MG/ML
10 INJECTION INTRAMUSCULAR; INTRAVENOUS
Status: DISCONTINUED | OUTPATIENT
Start: 2024-04-09 | End: 2024-04-09 | Stop reason: HOSPADM

## 2024-04-09 RX ORDER — KETOROLAC TROMETHAMINE 15 MG/ML
15 INJECTION, SOLUTION INTRAMUSCULAR; INTRAVENOUS EVERY 6 HOURS
Status: COMPLETED | OUTPATIENT
Start: 2024-04-09 | End: 2024-04-10

## 2024-04-09 RX ORDER — LIDOCAINE HYDROCHLORIDE ANHYDROUS AND DEXTROSE MONOHYDRATE 5; 400 G/100ML; MG/100ML
1 INJECTION, SOLUTION INTRAVENOUS CONTINUOUS
Status: ACTIVE | OUTPATIENT
Start: 2024-04-09 | End: 2024-04-10

## 2024-04-09 RX ORDER — NALOXONE HYDROCHLORIDE 0.4 MG/ML
INJECTION, SOLUTION INTRAMUSCULAR; INTRAVENOUS; SUBCUTANEOUS PRN
Status: DISCONTINUED | OUTPATIENT
Start: 2024-04-09 | End: 2024-04-09 | Stop reason: HOSPADM

## 2024-04-09 RX ORDER — OXYCODONE HYDROCHLORIDE 5 MG/1
10 TABLET ORAL EVERY 4 HOURS PRN
Status: DISCONTINUED | OUTPATIENT
Start: 2024-04-09 | End: 2024-04-10 | Stop reason: HOSPADM

## 2024-04-09 RX ORDER — LIDOCAINE HYDROCHLORIDE 20 MG/ML
INJECTION, SOLUTION EPIDURAL; INFILTRATION; INTRACAUDAL; PERINEURAL PRN
Status: DISCONTINUED | OUTPATIENT
Start: 2024-04-09 | End: 2024-04-09 | Stop reason: SDUPTHER

## 2024-04-09 RX ORDER — DEXAMETHASONE SODIUM PHOSPHATE 10 MG/ML
INJECTION INTRAMUSCULAR; INTRAVENOUS PRN
Status: DISCONTINUED | OUTPATIENT
Start: 2024-04-09 | End: 2024-04-09 | Stop reason: SDUPTHER

## 2024-04-09 RX ORDER — SODIUM CHLORIDE 0.9 % (FLUSH) 0.9 %
5-40 SYRINGE (ML) INJECTION EVERY 12 HOURS SCHEDULED
Status: DISCONTINUED | OUTPATIENT
Start: 2024-04-09 | End: 2024-04-09 | Stop reason: HOSPADM

## 2024-04-09 RX ORDER — TRANEXAMIC ACID 100 MG/ML
INJECTION, SOLUTION INTRAVENOUS PRN
Status: DISCONTINUED | OUTPATIENT
Start: 2024-04-09 | End: 2024-04-09 | Stop reason: SDUPTHER

## 2024-04-09 RX ORDER — APREPITANT 40 MG/1
40 CAPSULE ORAL ONCE
Status: COMPLETED | OUTPATIENT
Start: 2024-04-09 | End: 2024-04-09

## 2024-04-09 RX ORDER — SODIUM CHLORIDE 0.9 % (FLUSH) 0.9 %
5-40 SYRINGE (ML) INJECTION EVERY 12 HOURS SCHEDULED
Status: DISCONTINUED | OUTPATIENT
Start: 2024-04-09 | End: 2024-04-10 | Stop reason: HOSPADM

## 2024-04-09 RX ORDER — EPHEDRINE SULFATE 5 MG/ML
INJECTION INTRAVENOUS PRN
Status: DISCONTINUED | OUTPATIENT
Start: 2024-04-09 | End: 2024-04-09 | Stop reason: SDUPTHER

## 2024-04-09 RX ORDER — ATORVASTATIN CALCIUM 80 MG/1
80 TABLET, FILM COATED ORAL NIGHTLY
Status: DISCONTINUED | OUTPATIENT
Start: 2024-04-09 | End: 2024-04-10 | Stop reason: HOSPADM

## 2024-04-09 RX ORDER — PROCHLORPERAZINE EDISYLATE 5 MG/ML
5 INJECTION INTRAMUSCULAR; INTRAVENOUS
Status: DISCONTINUED | OUTPATIENT
Start: 2024-04-09 | End: 2024-04-09 | Stop reason: HOSPADM

## 2024-04-09 RX ORDER — SODIUM CHLORIDE, SODIUM LACTATE, POTASSIUM CHLORIDE, CALCIUM CHLORIDE 600; 310; 30; 20 MG/100ML; MG/100ML; MG/100ML; MG/100ML
INJECTION, SOLUTION INTRAVENOUS CONTINUOUS PRN
Status: DISCONTINUED | OUTPATIENT
Start: 2024-04-09 | End: 2024-04-09 | Stop reason: SDUPTHER

## 2024-04-09 RX ORDER — CYCLOBENZAPRINE HCL 10 MG
10 TABLET ORAL 3 TIMES DAILY PRN
Status: DISCONTINUED | OUTPATIENT
Start: 2024-04-09 | End: 2024-04-10 | Stop reason: HOSPADM

## 2024-04-09 RX ORDER — VANCOMYCIN HYDROCHLORIDE 1 G/20ML
INJECTION, POWDER, LYOPHILIZED, FOR SOLUTION INTRAVENOUS PRN
Status: DISCONTINUED | OUTPATIENT
Start: 2024-04-09 | End: 2024-04-09 | Stop reason: ALTCHOICE

## 2024-04-09 RX ORDER — PROMETHAZINE HYDROCHLORIDE 12.5 MG/1
12.5 TABLET ORAL EVERY 6 HOURS PRN
Status: DISCONTINUED | OUTPATIENT
Start: 2024-04-09 | End: 2024-04-10 | Stop reason: HOSPADM

## 2024-04-09 RX ORDER — LOSARTAN POTASSIUM 50 MG/1
100 TABLET ORAL DAILY
Status: DISCONTINUED | OUTPATIENT
Start: 2024-04-09 | End: 2024-04-10 | Stop reason: HOSPADM

## 2024-04-09 RX ORDER — AMLODIPINE BESYLATE 5 MG/1
5 TABLET ORAL DAILY
Status: DISCONTINUED | OUTPATIENT
Start: 2024-04-10 | End: 2024-04-10 | Stop reason: HOSPADM

## 2024-04-09 RX ORDER — LIDOCAINE HYDROCHLORIDE ANHYDROUS AND DEXTROSE MONOHYDRATE 5; 400 G/100ML; MG/100ML
INJECTION, SOLUTION INTRAVENOUS CONTINUOUS PRN
Status: DISCONTINUED | OUTPATIENT
Start: 2024-04-09 | End: 2024-04-09 | Stop reason: SDUPTHER

## 2024-04-09 RX ORDER — SUCCINYLCHOLINE/SOD CL,ISO/PF 200MG/10ML
SYRINGE (ML) INTRAVENOUS PRN
Status: DISCONTINUED | OUTPATIENT
Start: 2024-04-09 | End: 2024-04-09 | Stop reason: SDUPTHER

## 2024-04-09 RX ORDER — METHADONE HYDROCHLORIDE 10 MG/1
5 TABLET ORAL ONCE
Status: COMPLETED | OUTPATIENT
Start: 2024-04-09 | End: 2024-04-09

## 2024-04-09 RX ORDER — SODIUM CHLORIDE 0.9 % (FLUSH) 0.9 %
5-40 SYRINGE (ML) INJECTION PRN
Status: DISCONTINUED | OUTPATIENT
Start: 2024-04-09 | End: 2024-04-09 | Stop reason: HOSPADM

## 2024-04-09 RX ORDER — ONDANSETRON 2 MG/ML
INJECTION INTRAMUSCULAR; INTRAVENOUS PRN
Status: DISCONTINUED | OUTPATIENT
Start: 2024-04-09 | End: 2024-04-09 | Stop reason: SDUPTHER

## 2024-04-09 RX ORDER — GLYCOPYRROLATE 0.2 MG/ML
INJECTION INTRAMUSCULAR; INTRAVENOUS PRN
Status: DISCONTINUED | OUTPATIENT
Start: 2024-04-09 | End: 2024-04-09 | Stop reason: SDUPTHER

## 2024-04-09 RX ORDER — FENTANYL CITRATE 50 UG/ML
100 INJECTION, SOLUTION INTRAMUSCULAR; INTRAVENOUS
Status: DISCONTINUED | OUTPATIENT
Start: 2024-04-09 | End: 2024-04-09 | Stop reason: HOSPADM

## 2024-04-09 RX ORDER — HYDROMORPHONE HYDROCHLORIDE 1 MG/ML
0.5 INJECTION, SOLUTION INTRAMUSCULAR; INTRAVENOUS; SUBCUTANEOUS
Status: DISCONTINUED | OUTPATIENT
Start: 2024-04-09 | End: 2024-04-10 | Stop reason: HOSPADM

## 2024-04-09 RX ORDER — ROCURONIUM BROMIDE 10 MG/ML
INJECTION, SOLUTION INTRAVENOUS PRN
Status: DISCONTINUED | OUTPATIENT
Start: 2024-04-09 | End: 2024-04-09 | Stop reason: SDUPTHER

## 2024-04-09 RX ORDER — OXYCODONE HYDROCHLORIDE 5 MG/1
5 TABLET ORAL EVERY 4 HOURS PRN
Status: DISCONTINUED | OUTPATIENT
Start: 2024-04-09 | End: 2024-04-10 | Stop reason: HOSPADM

## 2024-04-09 RX ORDER — HYDROMORPHONE HYDROCHLORIDE 2 MG/ML
0.5 INJECTION, SOLUTION INTRAMUSCULAR; INTRAVENOUS; SUBCUTANEOUS EVERY 5 MIN PRN
Status: DISCONTINUED | OUTPATIENT
Start: 2024-04-09 | End: 2024-04-09 | Stop reason: HOSPADM

## 2024-04-09 RX ORDER — ONDANSETRON 2 MG/ML
4 INJECTION INTRAMUSCULAR; INTRAVENOUS EVERY 6 HOURS PRN
Status: DISCONTINUED | OUTPATIENT
Start: 2024-04-09 | End: 2024-04-10 | Stop reason: HOSPADM

## 2024-04-09 RX ORDER — PANTOPRAZOLE SODIUM 40 MG/1
40 TABLET, DELAYED RELEASE ORAL
Status: DISCONTINUED | OUTPATIENT
Start: 2024-04-10 | End: 2024-04-10 | Stop reason: HOSPADM

## 2024-04-09 RX ORDER — SODIUM CHLORIDE 9 MG/ML
INJECTION, SOLUTION INTRAVENOUS CONTINUOUS
Status: ACTIVE | OUTPATIENT
Start: 2024-04-09 | End: 2024-04-10

## 2024-04-09 RX ORDER — NEOSTIGMINE METHYLSULFATE 1 MG/ML
INJECTION, SOLUTION INTRAVENOUS PRN
Status: DISCONTINUED | OUTPATIENT
Start: 2024-04-09 | End: 2024-04-09 | Stop reason: SDUPTHER

## 2024-04-09 RX ORDER — SODIUM CHLORIDE 9 MG/ML
INJECTION, SOLUTION INTRAVENOUS PRN
Status: DISCONTINUED | OUTPATIENT
Start: 2024-04-09 | End: 2024-04-10 | Stop reason: HOSPADM

## 2024-04-09 RX ORDER — LABETALOL HYDROCHLORIDE 5 MG/ML
10 INJECTION, SOLUTION INTRAVENOUS
Status: DISCONTINUED | OUTPATIENT
Start: 2024-04-09 | End: 2024-04-09 | Stop reason: HOSPADM

## 2024-04-09 RX ADMIN — Medication 3 AMPULE: at 06:16

## 2024-04-09 RX ADMIN — Medication 4 MG: at 09:40

## 2024-04-09 RX ADMIN — GLYCOPYRROLATE 0.3 MG: 0.2 INJECTION INTRAMUSCULAR; INTRAVENOUS at 09:40

## 2024-04-09 RX ADMIN — ACETAMINOPHEN 1000 MG: 500 TABLET, FILM COATED ORAL at 06:17

## 2024-04-09 RX ADMIN — EPHEDRINE SULFATE 10 MG: 5 INJECTION INTRAVENOUS at 07:43

## 2024-04-09 RX ADMIN — LIDOCAINE HYDROCHLORIDE 1.5 MG/KG/HR: 4 INJECTION, SOLUTION INTRAVENOUS at 07:15

## 2024-04-09 RX ADMIN — TRANEXAMIC ACID 1000 MG: 100 INJECTION, SOLUTION INTRAVENOUS at 09:34

## 2024-04-09 RX ADMIN — METHADONE HYDROCHLORIDE 5 MG: 10 TABLET ORAL at 07:00

## 2024-04-09 RX ADMIN — PHENYLEPHRINE HYDROCHLORIDE 150 ML: 10 INJECTION INTRAVENOUS at 08:10

## 2024-04-09 RX ADMIN — DEXAMETHASONE SODIUM PHOSPHATE 10 MG: 10 INJECTION INTRAMUSCULAR; INTRAVENOUS at 07:14

## 2024-04-09 RX ADMIN — ROCURONIUM BROMIDE 40 MG: 10 INJECTION, SOLUTION INTRAVENOUS at 08:07

## 2024-04-09 RX ADMIN — Medication 2000 MG: at 07:14

## 2024-04-09 RX ADMIN — ATORVASTATIN CALCIUM 80 MG: 80 TABLET, FILM COATED ORAL at 21:31

## 2024-04-09 RX ADMIN — LIDOCAINE HYDROCHLORIDE 1 MG/KG/HR: 4 INJECTION, SOLUTION INTRAVENOUS at 10:24

## 2024-04-09 RX ADMIN — PHENYLEPHRINE HYDROCHLORIDE 50 ML: 10 INJECTION INTRAVENOUS at 07:51

## 2024-04-09 RX ADMIN — PHENYLEPHRINE HYDROCHLORIDE 100 ML: 10 INJECTION INTRAVENOUS at 07:25

## 2024-04-09 RX ADMIN — HYDROMORPHONE HYDROCHLORIDE 0.5 MG: 2 INJECTION INTRAMUSCULAR; INTRAVENOUS; SUBCUTANEOUS at 10:40

## 2024-04-09 RX ADMIN — ROCURONIUM BROMIDE 5 MG: 10 INJECTION, SOLUTION INTRAVENOUS at 09:17

## 2024-04-09 RX ADMIN — CEFAZOLIN SODIUM 2000 MG: 100 INJECTION, POWDER, LYOPHILIZED, FOR SOLUTION INTRAVENOUS at 13:34

## 2024-04-09 RX ADMIN — EPHEDRINE SULFATE 10 MG: 5 INJECTION INTRAVENOUS at 07:56

## 2024-04-09 RX ADMIN — GABAPENTIN 300 MG: 300 CAPSULE ORAL at 13:34

## 2024-04-09 RX ADMIN — PHENYLEPHRINE HYDROCHLORIDE 50 MCG/MIN: 10 INJECTION INTRAVENOUS at 08:05

## 2024-04-09 RX ADMIN — Medication 140 MG: at 07:08

## 2024-04-09 RX ADMIN — ACETAMINOPHEN 650 MG: 325 TABLET ORAL at 12:32

## 2024-04-09 RX ADMIN — POLYETHYLENE GLYCOL 3350 17 G: 17 POWDER, FOR SOLUTION ORAL at 13:32

## 2024-04-09 RX ADMIN — SODIUM CHLORIDE: 9 INJECTION, SOLUTION INTRAVENOUS at 12:32

## 2024-04-09 RX ADMIN — SODIUM CHLORIDE, POTASSIUM CHLORIDE, SODIUM LACTATE AND CALCIUM CHLORIDE: 600; 310; 30; 20 INJECTION, SOLUTION INTRAVENOUS at 06:19

## 2024-04-09 RX ADMIN — APREPITANT 40 MG: 40 CAPSULE ORAL at 06:18

## 2024-04-09 RX ADMIN — KETAMINE HYDROCHLORIDE 10 MG: 50 INJECTION, SOLUTION INTRAMUSCULAR; INTRAVENOUS at 08:15

## 2024-04-09 RX ADMIN — EPHEDRINE SULFATE 10 MG: 5 INJECTION INTRAVENOUS at 07:51

## 2024-04-09 RX ADMIN — PROPOFOL 200 MG: 10 INJECTION, EMULSION INTRAVENOUS at 07:08

## 2024-04-09 RX ADMIN — TRANEXAMIC ACID 1000 MG: 100 INJECTION, SOLUTION INTRAVENOUS at 08:26

## 2024-04-09 RX ADMIN — LIDOCAINE HYDROCHLORIDE 100 MG: 20 INJECTION, SOLUTION EPIDURAL; INFILTRATION; INTRACAUDAL; PERINEURAL at 07:08

## 2024-04-09 RX ADMIN — SODIUM CHLORIDE, SODIUM LACTATE, POTASSIUM CHLORIDE, AND CALCIUM CHLORIDE: 600; 310; 30; 20 INJECTION, SOLUTION INTRAVENOUS at 07:12

## 2024-04-09 RX ADMIN — GLYCOPYRROLATE 0.1 MG: 0.2 INJECTION INTRAMUSCULAR; INTRAVENOUS at 07:05

## 2024-04-09 RX ADMIN — OXYCODONE HYDROCHLORIDE 10 MG: 5 TABLET ORAL at 12:31

## 2024-04-09 RX ADMIN — ACETAMINOPHEN 650 MG: 325 TABLET ORAL at 17:15

## 2024-04-09 RX ADMIN — Medication 1 LOZENGE: at 21:31

## 2024-04-09 RX ADMIN — KETOROLAC TROMETHAMINE 15 MG: 15 INJECTION, SOLUTION INTRAMUSCULAR; INTRAVENOUS at 17:16

## 2024-04-09 RX ADMIN — KETOROLAC TROMETHAMINE 15 MG: 15 INJECTION, SOLUTION INTRAMUSCULAR; INTRAVENOUS at 12:32

## 2024-04-09 RX ADMIN — FENTANYL CITRATE 100 MCG: 50 INJECTION, SOLUTION INTRAMUSCULAR; INTRAVENOUS at 07:08

## 2024-04-09 RX ADMIN — ONDANSETRON 4 MG: 2 INJECTION INTRAMUSCULAR; INTRAVENOUS at 07:14

## 2024-04-09 RX ADMIN — EPHEDRINE SULFATE 15 MG: 5 INJECTION INTRAVENOUS at 07:27

## 2024-04-09 RX ADMIN — GABAPENTIN 300 MG: 300 CAPSULE ORAL at 21:31

## 2024-04-09 RX ADMIN — PROPOFOL 200 MCG/KG/MIN: 10 INJECTION, EMULSION INTRAVENOUS at 07:10

## 2024-04-09 RX ADMIN — KETAMINE HYDROCHLORIDE 30 MG: 50 INJECTION, SOLUTION INTRAMUSCULAR; INTRAVENOUS at 07:15

## 2024-04-09 ASSESSMENT — PAIN DESCRIPTION - LOCATION
LOCATION: BACK
LOCATION: BACK

## 2024-04-09 ASSESSMENT — PAIN DESCRIPTION - ORIENTATION
ORIENTATION: LOWER
ORIENTATION: LOWER

## 2024-04-09 ASSESSMENT — PAIN SCALES - GENERAL
PAINLEVEL_OUTOF10: 6
PAINLEVEL_OUTOF10: 7

## 2024-04-09 ASSESSMENT — PAIN DESCRIPTION - DESCRIPTORS
DESCRIPTORS: ACHING
DESCRIPTORS: SORE;TENDER

## 2024-04-09 ASSESSMENT — PAIN - FUNCTIONAL ASSESSMENT: PAIN_FUNCTIONAL_ASSESSMENT: 0-10

## 2024-04-09 NOTE — DISCHARGE INSTRUCTIONS
medicine, you may start with regular or extra-strength Tylenol. If you need more than Tylenol you can also take an anti-inflammatory medication such as Advil, Aleve, or Motrin in addition to the Tylenol.  Typically an opiate is also prescribed for breakthrough pain as needed.    Do not resume taking Fosamax for 8 weeks after your fusion surgery.  To help alleviate persistent soreness around the incision and muscles, apply ice or warm moist compresses.  It is common to have some increased nerve pain after surgery for weeks to sometimes months after neck or back surgery. This should resolve with time as your heal and the inflammation from surgery resolves.   If you need a refill on your pain medication, please note our office is closed over the weekend. It is our office policy that on-call providers DO NOT refill narcotic pain medications over the weekend. If you will need a refill over the weekend, please call our office by Thursday.    Driving  You may NOT drive a car until told otherwise by your physician. You may be a passenger for short distances (about 20-30 minutes.) If you must take a longer trip, be sure to make several pit stops so that you can walk and stretch your legs. Reclining in the passenger seat seems to be the most comfortable position for most patients.    Follow-Up Appointments  When you are discharged from the hospital, a follow up appointment will be made for 2-3 weeks from your surgery date. [facility] or call [phone] to confirm your follow-up appointment.    Medication Interaction  During your procedure you potentially received a medication or medications which may reduce the effectiveness of oral contraceptives. Please consider other forms of contraception for 1 month following your procedure if you are currently using oral contraceptives as your primary form of birth control. In addition to this, we recommend continuing your oral contraceptive as prescribed, unless otherwise

## 2024-04-09 NOTE — PERIOP NOTE
TRANSFER - OUT REPORT:    Verbal report given to TAMY Haynes on Logan Rodgers  being transferred to Room 721 for routine progression of patient care       Report consisted of patient’s Situation, Background, Assessment and   Recommendations(SBAR).     Information from the following report(s) Nurse Handoff Report, Index, Adult Overview, Surgery Report, MAR, Cardiac Rhythm SR, and Neuro Assessment was reviewed with the receiving nurse.    Lines:   Peripheral IV 04/09/24 Posterior;Right Hand (Active)   Site Assessment Clean, dry & intact 04/09/24 1100   Line Status Infusing 04/09/24 1100   Line Care Connections checked and tightened 04/09/24 1100   Phlebitis Assessment No symptoms 04/09/24 1100   Infiltration Assessment 0 04/09/24 1100   Alcohol Cap Used No 04/09/24 1100   Dressing Status Clean, dry & intact 04/09/24 1100   Dressing Type Transparent 04/09/24 1100       Peripheral IV 04/09/24 Posterior;Left Forearm (Active)   Site Assessment Clean, dry & intact 04/09/24 1100   Line Status Infusing 04/09/24 1100   Line Care Connections checked and tightened 04/09/24 1100   Phlebitis Assessment No symptoms 04/09/24 1100   Infiltration Assessment 0 04/09/24 1100   Alcohol Cap Used No 04/09/24 1100   Dressing Status Clean, dry & intact 04/09/24 1100   Dressing Type Transparent 04/09/24 1100        Opportunity for questions and clarification was provided.      Patient transported with:   O2 @ 4 liters  Tech    VTE prophylaxis orders have been written for Logan Rodgers.    Patient and family given floor number and nurses name.  Family updated re: pt status after security code verified.

## 2024-04-09 NOTE — OP NOTE
[facility] [id number]   [phone]    OPERATIVE REPORT    Patient ID:Logan Rodgers  [id number]  1950  74 y.o.    DATE OF SURGERY: 4/9/2024    SURGEON: Raleigh Cuadra MD    ASSISTANT: Moses David MD.   A skilled assistant was deemed necessary for the case due to the intermittent proximity of the spinal cord and nerve roots.  It was there for the entire duration of the surgery.      PREOPERATIVE DIAGNOSIS:     1. Lumbar stenosis adjacent to a prior lumbar decompression  2. Prior lumbar fusion with instrumentation    POSTOPERATIVE DIAGNOSIS:     1. Lumbar stenosis adjacent to a prior lumbar decompression  2. Prior lumbar fusion with instrumentation  3. Loose hardware and metallosis L4-S1     PROCEDURE:    Lateral flank incision  1.  L3 - L4 direct lateral arthrodesis (CPT 29229)   2. Insertion biomechanical device L3 - L4 (CPT 15088)  Posterior incision   3. Lumbar laminectomy  L3 through L5  with bilateral foraminotomies. (CPT 60879, 63048 X 2)  4. Lumbar posterolateral fusion  L3 through L4 . (CPT 86351)  5. Instrumentation  L3 through S1 . (CPT 90542)  6. Allograft (CPT 58701)  7.  L4 - S1 exploration of fusion  (CPT 87908)      ANESTHESIA: General.    ESTIMATED BLOOD LOSS: 150 ml    POSTOPERATIVE CONDITION: Stable.    INTRAOPERATIVE COMPLICATIONS: None.    IMPLANTS:   Implant Name Type Inv. Item Serial No.  Lot No. LRB No. Used Action   GRAFT BNE XL - [id number]  GRAFT BNE XL [id number] [employer]OLOG[employer]  N/A 1 Implanted   PUTTY BIO DBM 10 ML W CHIPS - [id number]  PUTTY BIO DBM 10 ML W CHIPS  [employer] [facility] [id number] N/A 1 Implanted   SPACER SPNL 7 DEG SM 45X18 MM PROLIFT - [id number]  SPACER SPNL 7 DEG SM 45X18 MM PROLIFT  [employer] [facility]- [id number] N/A 1 Implanted   ALLOGRAFT BNE CHIP 1-4 MM 30 CC CRUSH CAN - [id number]  ALLOGRAFT BNE CHIP 1-4 MM 30 CC CRUSH CANC [id number] [employer] [facility]  N/A 1

## 2024-04-09 NOTE — H&P
Name: Logan Rodgers  YOB: 1950  Gender: male  MRN: [id number]  Age: 74 y.o.        Chief Complaint:  Radiating back and leg pain     History of Present Illness:       He is about a year and a half status post L4-S1 fusion surgery.  The patient never really got relief of his right-sided radicular symptoms and they have only worsened despite efforts including prolonged physical therapy, oral steroids, muscle relaxants, injectable steroids..  I had sent him for an updated MRI and here is here to review the results of that study.       Medications:       Current Medication      Current Outpatient Medications:     gabapentin (NEURONTIN) 300 MG capsule, Take 1 capsule by mouth 2 times daily for 30 days. Intended supply: 30 days, Disp: 60 capsule, Rfl: 0    meloxicam (MOBIC) 15 MG tablet, Take 1 tablet by mouth daily for 28 days, Disp: 28 tablet, Rfl: 0    diclofenac sodium (VOLTAREN) 1 % GEL, Apply 2 g topically 3 times daily for 28 days, Disp: 168 g, Rfl: 0    atorvastatin (LIPITOR) 80 MG tablet, Take 80 mg by mouth at bedtime, Disp: , Rfl:     losartan-hydroCHLOROthiazide (HYZAAR) 100-25 MG per tablet, Take 1 tablet by mouth daily, Disp: , Rfl:     Omega-3 Fatty Acids (FISH OIL) 1000 MG CAPS, Take 1,000 mg by mouth daily, Disp: , Rfl:     Multiple Vitamins-Minerals (MENS MULTIVITAMIN PO), Take 1 tablet by mouth daily, Disp: , Rfl:     sertraline (ZOLOFT) 50 MG tablet, Take 2 tablets by mouth daily, Disp: , Rfl:     acetaminophen (TYLENOL) 650 MG extended release tablet, Take 650 mg by mouth every 8 hours, Disp: , Rfl:     amLODIPine (NORVASC) 5 MG tablet, Take 5 mg by mouth daily, Disp: , Rfl:     aspirin 81 MG EC tablet, Take 81 mg by mouth daily, Disp: , Rfl:     omeprazole (PRILOSEC) 20 MG delayed release capsule, Take 20 mg by mouth daily, Disp: , Rfl:         Allergies:       Allergies   Allergen Reactions    Tramadol Nausea And Vomiting            Physical Exam:      This is a well 
1 pair

## 2024-04-09 NOTE — ANESTHESIA PRE PROCEDURE
Department of Anesthesiology  Preprocedure Note       Name:  Logan Rodgers   Age:  74 y.o.  :  1950                                          MRN:  [id number]         Date:  2024      Surgeon: Surgeon(s):  Raleigh Cruz MD    Procedure: Procedure(s):  ERAS/ L3-L4 direct lateral interbody fusion  with interbody cage, allograft  L3-5 laminectomy and posterior fusion with allograft and L3-S1 instrumentation.    Medications prior to admission:   Prior to Admission medications    Medication Sig Start Date End Date Taking? Authorizing Provider   naproxen (NAPROSYN) 500 MG tablet Take 1 tablet by mouth 2 times daily (with meals)    Carrie Chester MD   cetirizine (ZYRTEC) 10 MG tablet Take 1 tablet by mouth daily    Carrie Chester MD   vitamin D 25 MCG (1000 UT) CAPS Take by mouth daily    Carrie Chester MD   gabapentin (NEURONTIN) 300 MG capsule Take 1 capsule by mouth 2 times daily for 30 days. Intended supply: 30 days 3/2/24 4/9/24  Raleigh Cruz MD   atorvastatin (LIPITOR) 80 MG tablet Take 1 tablet by mouth at bedtime    Carrie Chester MD   losartan-hydroCHLOROthiazide (HYZAAR) 100-25 MG per tablet Take 1 tablet by mouth daily    Carrie Chester MD   Multiple Vitamins-Minerals (MENS MULTIVITAMIN PO) Take 1 tablet by mouth daily    Carrie Chester MD   sertraline (ZOLOFT) 100 MG tablet Take 1 tablet by mouth daily    Carrie Chester MD   acetaminophen (TYLENOL) 650 MG extended release tablet Take 1 tablet by mouth as needed    Automatic Reconciliation, Ar   amLODIPine (NORVASC) 5 MG tablet Take 1 tablet by mouth daily    Automatic Reconciliation, Ar   aspirin 81 MG EC tablet Take 1 tablet by mouth daily preventative    Automatic Reconciliation, Ar   omeprazole (PRILOSEC) 20 MG delayed release capsule Take 1 capsule by mouth daily    Automatic Reconciliation, Ar   diclofenac (VOLTAREN) 75 MG EC tablet Take 75 mg by mouth daily  22

## 2024-04-10 VITALS
TEMPERATURE: 98.1 F | RESPIRATION RATE: 17 BRPM | WEIGHT: 203.4 LBS | HEIGHT: 67 IN | HEART RATE: 74 BPM | OXYGEN SATURATION: 98 % | SYSTOLIC BLOOD PRESSURE: 132 MMHG | DIASTOLIC BLOOD PRESSURE: 73 MMHG | BODY MASS INDEX: 31.92 KG/M2

## 2024-04-10 PROCEDURE — 6360000002 HC RX W HCPCS: Performed by: ORTHOPAEDIC SURGERY

## 2024-04-10 PROCEDURE — 97530 THERAPEUTIC ACTIVITIES: CPT

## 2024-04-10 PROCEDURE — 97110 THERAPEUTIC EXERCISES: CPT

## 2024-04-10 PROCEDURE — 6370000000 HC RX 637 (ALT 250 FOR IP): Performed by: ORTHOPAEDIC SURGERY

## 2024-04-10 PROCEDURE — 2580000003 HC RX 258: Performed by: ORTHOPAEDIC SURGERY

## 2024-04-10 RX ADMIN — ACETAMINOPHEN 650 MG: 325 TABLET ORAL at 06:17

## 2024-04-10 RX ADMIN — PANTOPRAZOLE SODIUM 40 MG: 40 TABLET, DELAYED RELEASE ORAL at 06:17

## 2024-04-10 RX ADMIN — GABAPENTIN 300 MG: 300 CAPSULE ORAL at 08:57

## 2024-04-10 RX ADMIN — KETOROLAC TROMETHAMINE 15 MG: 15 INJECTION, SOLUTION INTRAMUSCULAR; INTRAVENOUS at 00:20

## 2024-04-10 RX ADMIN — ACETAMINOPHEN 650 MG: 325 TABLET ORAL at 00:19

## 2024-04-10 RX ADMIN — SERTRALINE 100 MG: 50 TABLET, FILM COATED ORAL at 08:57

## 2024-04-10 RX ADMIN — CETIRIZINE HYDROCHLORIDE 10 MG: 10 TABLET, FILM COATED ORAL at 08:58

## 2024-04-10 RX ADMIN — HYDROCHLOROTHIAZIDE 25 MG: 25 TABLET ORAL at 08:57

## 2024-04-10 RX ADMIN — SODIUM CHLORIDE, PRESERVATIVE FREE 5 ML: 5 INJECTION INTRAVENOUS at 08:58

## 2024-04-10 RX ADMIN — AMLODIPINE BESYLATE 5 MG: 5 TABLET ORAL at 08:57

## 2024-04-10 RX ADMIN — ACETAMINOPHEN 650 MG: 325 TABLET ORAL at 11:12

## 2024-04-10 RX ADMIN — CEFAZOLIN SODIUM 2000 MG: 100 INJECTION, POWDER, LYOPHILIZED, FOR SOLUTION INTRAVENOUS at 00:22

## 2024-04-10 RX ADMIN — LOSARTAN POTASSIUM 100 MG: 50 TABLET, FILM COATED ORAL at 08:58

## 2024-04-10 RX ADMIN — POLYETHYLENE GLYCOL 3350 17 G: 17 POWDER, FOR SOLUTION ORAL at 08:56

## 2024-04-10 RX ADMIN — BISACODYL 5 MG: 5 TABLET, COATED ORAL at 08:57

## 2024-04-10 NOTE — PROGRESS NOTES
ACUTE PHYSICAL THERAPY GOALS:   (Developed with and agreed upon by patient and/or caregiver.)  Pt will perform bed mobility with Darlene while appropriately maintaining spinal precautions in 7 therapy sessions.  Pt will perform sit-to-stand/ stand-to-sit transfers Darlene in 7 therapy sessions.  Pt will ambulate 250 ft Darlene with use of LRAD/no device and breaks as needed in 7 therapy sessions.  Pt will tolerate 20 minutes of standing activity with LRAD/no device in 7 therapy sessions.  Pt will perform standing dynamic balance activities with minimal postural sway in 7 therapy sessions.  Pt will tolerate multiple sets and reps of BLE exercises in 7 therapy sessions.  Pt will accurately verbalize and demonstrate spinal precautions in 7 therapy sessions.        PHYSICAL THERAPY: Daily Note PM   (Link to Caseload Tracking: PT Visit Days : 2  Time In/Out PT Charge Capture  Rehab Caseload Tracker  Orders    Logan Rodgers is a 74 y.o. male   PRIMARY DIAGNOSIS: Spinal stenosis, lumbar region, with neurogenic claudication  Spinal stenosis, lumbar region, with neurogenic claudication [M48.062]  Lumbar radiculopathy [M54.16]  S/P lumbar fusion [Z98.1]  Procedure(s) (LRB):  ERAS/ L3-L4 direct lateral interbody fusion  with interbody cage, allograft (N/A)  L3-5 laminectomy and posterior fusion with allograft and L3-S1 instrumentation. (N/A)  1 Day Post-Op  Inpatient: Payor: MEDICARE / Plan: MEDICARE PART A AND B / Product Type: *No Product type* /     ASSESSMENT:     REHAB RECOMMENDATIONS:   Recommendation to date pending progress:  Setting:  Home Health Therapy    Equipment:    None  Pt has RW     ASSESSMENT:  Mr. Rodgers was up in room with wife present on contact and agreeable to work with therapy. The PT session today focused on amb. Pt recalled 3/3 precautions. He amb 450' with RW independently.  Progressing very well.      SUBJECTIVE:   Mr. Rodgers states  \"I'm leaving sometime today\".     Social/Functional 
4 Eyes Skin Assessment     NAME:  Logan Rodgers  YOB: 1950  MEDICAL RECORD NUMBER:  [id number]    The patient is being assessed for  {Reason for Assessment:20352}    I agree that at least one RN has performed a thorough Head to Toe Skin Assessment on the patient. ALL assessment sites listed below have been assessed.      Areas assessed by both nurses:    Sacrum. Buttock, Coccyx, Ischium        Does the Patient have a Wound? No noted wound(s)       Devon Prevention initiated by RN: No  Wound Care Orders initiated by RN: No    Pressure Injury (Stage 3,4, Unstageable, DTI, NWPT, and Complex wounds) if present, place Wound referral order by RN under : No    New Ostomies, if present place, Ostomy referral order under : No     Nurse 1 eSignature: Electronically signed by XUAN ANTHONY RN on 4/9/24 at 2:17 PM EDT    **SHARE this note so that the co-signing nurse can place an eSignature**    Nurse 2 eSignature: {Esignature:[id number]}    
ACUTE OCCUPATIONAL THERAPY GOALS:   (Developed with and agreed upon by patient and/or caregiver.)  1. Pt will toilet SBA   2. Pt will complete functional mobility for ADLs with standby assist using AD as needed  3. Pt will complete lower body dressing with mod I using AE as needed  4. Pt will complete grooming at sink with SBA  5. Pt will independently verbalize/ maintain 100% lumbar precautions     Timeframe: 7 visits      OCCUPATIONAL THERAPY Initial Assessment and Daily Note       OT Visit Days: 1  Acknowledge Orders  Time  OT Charge Capture  Rehab Caseload Tracker      Logan Rodgers is a 74 y.o. male   PRIMARY DIAGNOSIS: Spinal stenosis, lumbar region, with neurogenic claudication  Spinal stenosis, lumbar region, with neurogenic claudication [M48.062]  Lumbar radiculopathy [M54.16]  S/P lumbar fusion [Z98.1]  Procedure(s) (LRB):  ERAS/ L3-L4 direct lateral interbody fusion  with interbody cage, allograft (N/A)  L3-5 laminectomy and posterior fusion with allograft and L3-S1 instrumentation. (N/A)  Day of Surgery  Reason for Referral: Low Back Pain (M54.5)  Inpatient: Payor: MEDICARE / Plan: MEDICARE PART A AND B / Product Type: *No Product type* /     ASSESSMENT:     REHAB RECOMMENDATIONS:   Recommendation to date pending progress:  Setting:  No further skilled occupational therapy after discharge from hospital    Equipment:    Rolling Walker  Bedside commode as shower chair      ASSESSMENT:  Mr. Rodgers is post op L3-5 laminectomy. Pt presented with deficits in mobility, activity tolerance, balance, and limitations of post op restrictions impacting ADLs. Pt educated on lumbar precautions and on adaptive techniques to maintain during ADLs. Pt requires contact guard assistance-min A for LB ADLs, has a hip kit at home but needs instruction on how to use. Pt required min A to stand from chair, contact guard assistance for ADLs at sink and for functional mobility using rolling walker. Activity tolerance 
ACUTE PHYSICAL THERAPY GOALS:   (Developed with and agreed upon by patient and/or caregiver.)  Pt will perform bed mobility with Darlene while appropriately maintaining spinal precautions in 7 therapy sessions.  Pt will perform sit-to-stand/ stand-to-sit transfers Darlene in 7 therapy sessions.  Pt will ambulate 250 ft Darlene with use of LRAD/no device and breaks as needed in 7 therapy sessions.  Pt will tolerate 20 minutes of standing activity with LRAD/no device in 7 therapy sessions.  Pt will perform standing dynamic balance activities with minimal postural sway in 7 therapy sessions.  Pt will tolerate multiple sets and reps of BLE exercises in 7 therapy sessions.  Pt will accurately verbalize and demonstrate spinal precautions in 7 therapy sessions.     PHYSICAL THERAPY Initial Assessment and PM  (Link to Caseload Tracking: PT Visit Days : 1  Acknowledge Orders  Time In/Out  PT Charge Capture  Rehab Caseload Tracker    Spinal Precautions    Logan Rodgers is a 74 y.o. male   PRIMARY DIAGNOSIS: Spinal stenosis, lumbar region, with neurogenic claudication  Spinal stenosis, lumbar region, with neurogenic claudication [M48.062]  Lumbar radiculopathy [M54.16]  S/P lumbar fusion [Z98.1]  Procedure(s) (LRB):  ERAS/ L3-L4 direct lateral interbody fusion  with interbody cage, allograft (N/A)  L3-5 laminectomy and posterior fusion with allograft and L3-S1 instrumentation. (N/A)  Day of Surgery  Reason for Referral: Other abnormalities of gait and mobility (R26.89)  Inpatient: Payor: MEDICARE / Plan: MEDICARE PART A AND B / Product Type: *No Product type* /     ASSESSMENT:     REHAB RECOMMENDATIONS:   Recommendation to date pending progress:  Setting:  Home Health Therapy    Equipment:    None pt owns a variety of home equipment including a RW/rollator     ASSESSMENT:  Mr. Rodgers is a 74 y.o. male presenting to PT s/p L3-L4 direct lateral interbody fusion/L3-5 laminectomy and posterior fusion. At baseline, pt 
Dr. Magallanes reviewed chart. No order received. Ok to proceed.    
ORTHO PROGRESS NOTE    April 10, 2024    Admit Date: 2024  Post Op day: 1 Day Post-Op      Subjective:     Logan Rodgers is a patient who is now 1 Day Post-Op  and has no complaints.       Objective:     PT/OT:    Independent    Vital Signs:    Patient Vitals for the past 8 hrs:   BP Temp Temp src Pulse Resp SpO2   04/10/24 0857 132/73 -- -- -- -- --   04/10/24 0824 132/73 98.1 °F (36.7 °C) Oral 74 17 98 %     Temp (24hrs), Av.6 °F (36.4 °C), Min:97.3 °F (36.3 °C), Max:98.1 °F (36.7 °C)      LAB:    No results for input(s): \"HGB\", \"WBC\", \"PLT\" in the last 72 hours.    Physical Exam:    Awake and in no acute distress.  Mood and affect appropriate.  Respirations unlabored and no evidence cyanosis.  Calves nontender.  Abdomen soft and nontender.  Dressing dressing not removed  No new neurologic deficit.    Assessment:      1 Day Post-Op STATUS POST Procedure(s):  ERAS/ L3-L4 direct lateral interbody fusion  with interbody cage, allograft  L3-5 laminectomy and posterior fusion with allograft and L3-S1 instrumentation.      Plan:     D/c drain.   Anticipate discharge to: HOME today      Signed By: AMEYA CUNHA MD        
Additional Comments: Lives w/ wife, independent, no AD, no falls. WIS, elevated toilet, cane, rollator  OBJECTIVE:     PAIN: VITALS / O2: PRECAUTION / LINES / DRAINS:   Pre Treatment:   0         Post Treatment: 0 Vitals        Oxygen    IV    RESTRICTIONS/PRECAUTIONS:  Position Activity Restriction  Spinal Precautions: No Bending, No Lifting, No Twisting     MOBILITY:   I Mod I S SBA CGA Min Mod Max Total  NT x2 Comments:   Bed Mobility    Rolling [] [] [] [] [] [] [] [] [] [x] []    Supine to Sit [] [] [] [] [] [] [] [] [] [x] []    Scooting [] [] [] [] [] [] [] [] [] [x] []    Sit to Supine [] [] [] [] [] [] [] [] [] [x] []    Transfers    Sit to Stand [] [] [x] [] [] [] [] [] [] [] []    Bed to Chair [] [] [] [] [] [] [] [] [] [x] []    Stand to Sit [] [] [x] [] [] [] [] [] [] [] []     [] [] [] [] [] [] [] [] [] [] []    I=Independent, Mod I=Modified Independent, S=Supervision, SBA=Standby Assistance, CGA=Contact Guard Assistance,   Min=Minimal Assistance, Mod=Moderate Assistance, Max=Maximal Assistance, Total=Total Assistance, NT=Not Tested    BALANCE: Good Fair+ Fair Fair- Poor NT Comments   Sitting Static [x] [] [] [] [] []    Sitting Dynamic [x] [] [] [] [] []              Standing Static [x] [] [] [] [] []    Standing Dynamic [] [x] [] [] [] []      GAIT: I Mod I S SBA CGA Min Mod Max Total  NT x2 Comments:   Level of Assistance [] [] [x] [] [] [] [] [] [] [] []    Distance 450  feet    DME Rolling Walker    Gait Quality Decreased toshia     Weightbearing Status      Stairs      I=Independent, Mod I=Modified Independent, S=Supervision, SBA=Standby Assistance, CGA=Contact Guard Assistance,   Min=Minimal Assistance, Mod=Moderate Assistance, Max=Maximal Assistance, Total=Total Assistance, NT=Not Tested    PLAN:   FREQUENCY AND DURATION: BID for duration of hospital stay or until stated goals are met, whichever comes first.    TREATMENT:   TREATMENT:   Therapeutic Activity (15 Minutes): Therapeutic activity

## 2024-04-10 NOTE — CARE COORDINATION
Patient medically ready for discharge per attending.  Patient admitted for elective surgery with Dr. Cuadra.  Therapy recommending HH PT/OT at discharge and HH referral/order was already sent to [facility] by attending provider's office.  No DME needs recommended.  No other Cm needs noted at this time.        04/10/24 8063   Service Assessment   Patient Orientation Alert and Oriented   Cognition Alert   History Provided By Medical Record   Primary Caregiver Self   Accompanied By/Relationship N/A   Support Systems Spouse/Significant Other   Patient's Healthcare Decision Maker is: Legal Next of Kin   PCP Verified by CM Yes   Last Visit to PCP Within last 3 months   Prior Functional Level Independent in ADLs/IADLs   Current Functional Level Independent in ADLs/IADLs   Can patient return to prior living arrangement Yes   Ability to make needs known: Good   Family able to assist with home care needs: Yes   Would you like for me to discuss the discharge plan with any other family members/significant others, and if so, who? No   Financial Resources Medicare   Community Resources None   CM/SW Referral Other (see comment)  (No CM consult)   Social/Functional History   Lives With Spouse

## 2024-04-10 NOTE — PLAN OF CARE
Problem: Pain  Goal: Verbalizes/displays adequate comfort level or baseline comfort level  4/10/2024 1537 by Delmy Andres RN  Outcome: Adequate for Discharge  4/10/2024 1535 by Delmy Andres RN  Outcome: Progressing  4/10/2024 0208 by Lucero Oseguera RN  Outcome: Progressing     Problem: Safety - Adult  Goal: Free from fall injury  4/10/2024 1537 by Delmy Andres RN  Outcome: Adequate for Discharge  4/10/2024 1535 by Delmy Andres RN  Outcome: Progressing  4/10/2024 0208 by Lucero Oseguera RN  Outcome: Progressing     Problem: Discharge Planning  Goal: Discharge to home or other facility with appropriate resources  4/10/2024 1537 by Delmy Andres RN  Outcome: Adequate for Discharge  4/10/2024 1535 by Delmy Andres RN  Outcome: Progressing  4/10/2024 0208 by Lucero Oseguera RN  Outcome: Progressing     Problem: ABCDS Injury Assessment  Goal: Absence of physical injury  4/10/2024 1537 by Delmy Andres RN  Outcome: Adequate for Discharge  4/10/2024 1535 by Delmy Andres RN  Outcome: Progressing

## 2024-04-10 NOTE — PLAN OF CARE
Problem: Pain  Goal: Verbalizes/displays adequate comfort level or baseline comfort level  4/10/2024 1535 by Delmy Andres RN  Outcome: Progressing  4/10/2024 0208 by Lucero Oseguera RN  Outcome: Progressing     Problem: Safety - Adult  Goal: Free from fall injury  4/10/2024 1535 by Delmy Andres RN  Outcome: Progressing  4/10/2024 0208 by Lucero Oseguera RN  Outcome: Progressing     Problem: Discharge Planning  Goal: Discharge to home or other facility with appropriate resources  4/10/2024 1535 by Delmy Andres RN  Outcome: Progressing  4/10/2024 0208 by Lucero Oseguera RN  Outcome: Progressing     Problem: ABCDS Injury Assessment  Goal: Absence of physical injury  Outcome: Progressing

## 2024-04-25 ENCOUNTER — OFFICE VISIT (OUTPATIENT)
Dept: ORTHOPEDIC SURGERY | Age: 74
End: 2024-04-25

## 2024-04-25 DIAGNOSIS — Z98.1 STATUS POST LUMBAR SPINAL ARTHRODESIS: Primary | ICD-10-CM

## 2024-04-25 PROCEDURE — 99024 POSTOP FOLLOW-UP VISIT: CPT | Performed by: ORTHOPAEDIC SURGERY

## 2024-04-25 NOTE — PROGRESS NOTES
Name: Logan Rodgers  YOB: 1950  Gender: male  MRN: [id number]  Age: 74 y.o.    Chief Complaint: Lumbar spine surgery follow up    History of Present Illness:      Logan Rodgers  is here for 2 week follow up of his  DLIF and posterior lumbar fusion surgery.  He reports significant relief of preoperative lower extremity pain, weakness and parasthesias. There is the expected residual back stiffness.           Medications:       Current Outpatient Medications:     naproxen (NAPROSYN) 500 MG tablet, Take 1 tablet by mouth 2 times daily (with meals), Disp: , Rfl:     cetirizine (ZYRTEC) 10 MG tablet, Take 1 tablet by mouth daily, Disp: , Rfl:     vitamin D 25 MCG (1000 UT) CAPS, Take by mouth daily, Disp: , Rfl:     gabapentin (NEURONTIN) 300 MG capsule, Take 1 capsule by mouth 2 times daily for 30 days. Intended supply: 30 days, Disp: 60 capsule, Rfl: 0    atorvastatin (LIPITOR) 80 MG tablet, Take 1 tablet by mouth at bedtime, Disp: , Rfl:     losartan-hydroCHLOROthiazide (HYZAAR) 100-25 MG per tablet, Take 1 tablet by mouth daily, Disp: , Rfl:     Multiple Vitamins-Minerals (MENS MULTIVITAMIN PO), Take 1 tablet by mouth daily, Disp: , Rfl:     sertraline (ZOLOFT) 100 MG tablet, Take 1 tablet by mouth daily, Disp: , Rfl:     acetaminophen (TYLENOL) 650 MG extended release tablet, Take 1 tablet by mouth as needed, Disp: , Rfl:     amLODIPine (NORVASC) 5 MG tablet, Take 1 tablet by mouth daily, Disp: , Rfl:     aspirin 81 MG EC tablet, Take 1 tablet by mouth daily preventative, Disp: , Rfl:     omeprazole (PRILOSEC) 20 MG delayed release capsule, Take 1 capsule by mouth daily, Disp: , Rfl:     Allergies:  No Known Allergies      Physical Exam:      Respirations are unlabored and there is no evidence of cyanosis      Wound is healed nicely without erythema, drainage or underlying fluctuance    Gait is normal    Sensory testing reveals intact sensation to light touch and in the

## 2024-06-20 ENCOUNTER — OFFICE VISIT (OUTPATIENT)
Age: 74
End: 2024-06-20

## 2024-06-20 DIAGNOSIS — Z98.1 STATUS POST LUMBAR SPINAL ARTHRODESIS: Primary | ICD-10-CM

## 2024-06-20 PROCEDURE — 99024 POSTOP FOLLOW-UP VISIT: CPT | Performed by: ORTHOPAEDIC SURGERY

## 2024-06-20 NOTE — PROGRESS NOTES
distribution of the L3-S1 dermatomes bilaterally.    Strength testing in the lower extremity reveals the following based on the 5 point grading scale:       HF (L2) H Ab (L5) KE (L3/4) ADF (L4) EHL (L5) A Ev (S1) APF (S1)   Right 5 5 5 5 5 5 5   Left 5 5 5 5 5 5 5        Radiographic Studies:     X-rays including AP and lateral views of the lumbar spine were reviewed and interpreted:     Postoperative changes are noted status post lumbar fusion with instrumentation.  The hardware appears to be well-placed and intact.  There is no evidence of significant osteolysis.  No significant adjacent level decompensation.  Alignment is maintained.    Radiographic Impression:    Appropriate postoperative changes status post lumbar laminectomy and fusion without evidence for hardware failure or decompensation.    Diagnosis:      ICD-10-CM    1. Status post lumbar spinal arthrodesis  Z98.1           Assessment/Plan:      This patient is following the expected course following the spine surgery.     He can continue to gradually increase activities as tolerated.  I will have him return for follow up in 4 months.           Electronically Signed By Raleigh Mcnamara MD   06/20/24  10:34 AM

## 2024-06-24 ENCOUNTER — TELEPHONE (OUTPATIENT)
Dept: ORTHOPEDIC SURGERY | Age: 74
End: 2024-06-24

## 2024-06-24 ENCOUNTER — OFFICE VISIT (OUTPATIENT)
Age: 74
End: 2024-06-24

## 2024-06-24 DIAGNOSIS — Z98.1 STATUS POST LUMBAR SPINAL ARTHRODESIS: Primary | ICD-10-CM

## 2024-06-24 PROCEDURE — 99024 POSTOP FOLLOW-UP VISIT: CPT | Performed by: ORTHOPAEDIC SURGERY

## 2024-06-24 NOTE — PROGRESS NOTES
Name: Logan Rodgers  YOB: 1950  Gender: male  MRN: [id number]  Age: 74 y.o.    Chief Complaint: Lumbar spine surgery follow up    History of Present Illness:      Logan Rodgers  is here for  follow up of his  DLIF  and posterior fusion surgery, status post fall on 06/22/2024 when he thought there was a step to his side but there was not.  He tweaked his back but does not think he injured anything.      Physical Exam:     No change in neurologic exam.  Gait is upright and nonantalgic.       Radiographic Studies:     X-rays including AP and lateral views of the lumbar spine were reviewed and interpreted:     Postoperative changes are noted status post lumbar fusion with instrumentation.  The hardware appears to be well-placed and intact.  There is no evidence of significant osteolysis.  No significant adjacent level decompensation.  Alignment is maintained.    Radiographic Impression:    Appropriate postoperative changes status post lumbar laminectomy and fusion without evidence for hardware failure or decompensation.    Diagnosis:      ICD-10-CM    1. Status post lumbar spinal arthrodesis  Z98.1 XR LUMBAR SPINE (2-3 VIEWS)          Assessment/Plan:      Everything is reassuring despite the fall over the weekend.  He will follow-up with me as scheduled.      Electronically Signed By Raleigh Mcnamara MD   06/24/24  12:42 PM

## 2024-06-24 NOTE — TELEPHONE ENCOUNTER
Patient's wife, Jessica, calling to ask if Connie will please return her call about her  having a fall recently.  Thank you.

## 2024-06-24 NOTE — TELEPHONE ENCOUNTER
Pt fell sat .wants t be seen today.can we carly him thanks I have carly him for Thursday,he has some pain

## 2024-08-01 NOTE — PROGRESS NOTES
History and Physical - General Surgery  Maddy Gray 52 y o  female MRN: [id number]  Unit/Bed#: ED 10 Encounter: [id number]        Assessment/Plan     Assessment:  52 F with biliary colic vs cholecystitis, possible CBD stone on CT    Plan: Will admit for observation  RUQ ultrasound  Trend LFTs  Possible laparoscopic cholecystectomy, timing to be determined  Pain control  DVT prophylaxis    History of Present Illness     HPI:  Maddy Gray is a 52 y o  female who presents to the hospital with abdominal pain  She states that it started Thursday night  She usually eats relatively healthy but had a particularly fatty meal on Thursday and had pain afterward  It is located in the epigastrium and RUQ and radiates to the upper back  It is associated with some nausea  The pain improved but returned yesterday when she tried to eat again  She endorses feeling warm but otherwise denies fevers or chills  She states she is otherwise healthy  Does have a history of laparoscopic ectopic pregnancy removal     Review of Systems   Constitutional: Negative  HENT: Negative  Eyes: Negative  Respiratory: Negative  Cardiovascular: Negative  Gastrointestinal: Positive for abdominal pain and nausea  Negative for diarrhea and vomiting  Endocrine: Negative  Genitourinary: Negative  Musculoskeletal: Negative  Skin: Negative  Allergic/Immunologic: Negative  Neurological: Negative  Hematological: Negative  Psychiatric/Behavioral: Negative  Historical Information   History reviewed  No pertinent past medical history    Past Surgical History:   Procedure Laterality Date    ECTOPIC PREGNANCY SURGERY       Social History   Social History     Substance and Sexual Activity   Alcohol Use Yes    Comment: socially     Social History     Substance and Sexual Activity   Drug Use Yes    Types: Marijuana     Social History     Tobacco Use   Smoking Status Never Smoker   Smokeless Tobacco Never Used PHYSICAL THERAPY: Daily Note AM   (Link to Caseload Tracking: PT Visit Days : 3  Time In/Out PT Charge Capture  Rehab Caseload Tracker  Orders    Luiza Tiwari is a 67 y.o. 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 Kerwin Arteaga was supine upon contact and agreeable to therapy. SBA for bed mobility and transfers with cues to maintain spinal precautions/log rolling technique. Ambulated 500' RW CGA with cues for activity pacing and proximity to walker. In room, reviewed spinal precautions and evaluated patient understanding of HEP to perform outside of therapy at least 3 times a day. Participated in LE exercises with cues for activity pacing and LE sequencing, provided moderate manual resistance to promote muscle strength and activity tolerance. Encouraged patient to continue HEP at home and use resistance bands as tolerated for resistive exercise. Steadily progressing towards goals and will continue PT efforts.      SUBJECTIVE:   Mr. Kerwin Arteaga states, \"I hope I can go home today\"     Social/Functional Lives With: Spouse  Type of Home: House  Home Layout: One level  Home Access: Level entry  Has the patient had two or more falls in the past year or any fall with injury in the past year?: No  ADL Assistance: [address] [place] [address] Avenue: Independent  Ambulation Assistance: Independent  Transfer Assistance: Family History: History reviewed  No pertinent family history  Meds/Allergies   PTA meds:   None     Allergies   Allergen Reactions    Amoxicillin Facial Swelling       Objective   First Vitals:   Blood Pressure: 158/74 (04/04/21 0123)  Pulse: 103 (04/04/21 0123)  Temperature: 98 3 °F (36 8 °C) (04/04/21 0123)  Temp Source: Oral (04/04/21 0123)  Respirations: 18 (04/04/21 0123)  Weight - Scale: 126 kg (276 lb 10 8 oz) (04/04/21 0123)  SpO2: 98 % (04/04/21 0123)    Current Vitals:   Blood Pressure: 124/66 (04/04/21 0400)  Pulse: 96 (04/04/21 0400)  Temperature: 98 3 °F (36 8 °C) (04/04/21 0123)  Temp Source: Oral (04/04/21 0123)  Respirations: 18 (04/04/21 0400)  Weight - Scale: 126 kg (276 lb 10 8 oz) (04/04/21 0123)  SpO2: 99 % (04/04/21 0400)    No intake or output data in the 24 hours ending 04/04/21 0446    Invasive Devices     Peripheral Intravenous Line            Peripheral IV 04/04/21 Right Antecubital less than 1 day                Physical Exam  Constitutional:       Appearance: Normal appearance  HENT:      Head: Normocephalic  Mouth/Throat:      Mouth: Mucous membranes are moist    Eyes:      Pupils: Pupils are equal, round, and reactive to light  Neck:      Musculoskeletal: Normal range of motion  Cardiovascular:      Rate and Rhythm: Normal rate  Pulmonary:      Effort: No respiratory distress  Abdominal:      Comments: Soft, non-distended, mildly tender epigastrium and RUQ, negative Astorga's sign   Musculoskeletal:         General: No tenderness or deformity  Skin:     General: Skin is warm and dry  Capillary Refill: Capillary refill takes less than 2 seconds  Neurological:      General: No focal deficit present  Mental Status: She is alert     Psychiatric:         Mood and Affect: Mood normal          Behavior: Behavior normal          Lab Results:   CBC:   Lab Results   Component Value Date    WBC 8 11 04/04/2021    HGB 12 2 04/04/2021    HCT 38 8 04/04/2021 Independent  Occupation: Retired  OBJECTIVE:     PAIN: VITALS / O2: PRECAUTION / [place] / [place]:   Pre Treatment:   Pain Level: 0      Post Treatment: 0 Vitals        Oxygen    None    RESTRICTIONS/PRECAUTIONS:  Restrictions/Precautions  Restrictions/Precautions: Fall Risk  Position Activity Restriction  Spinal Precautions: No Bending, No Lifting, No Twisting  Restrictions/Precautions: Fall Risk     MOBILITY: I Mod I S SBA CGA Min Mod Max Total  NT x2 Comments:   Bed Mobility    Rolling [] [] [] [x] [] [] [] [] [] [] []    Supine to Sit [] [] [] [x] [] [] [] [] [] [] []    Scooting [] [] [] [x] [] [] [] [] [] [] []    Sit to Supine [] [] [] [x] [] [] [] [] [] [] []    Transfers    Sit to Stand [] [] [] [x] [] [] [] [] [] [] []    Bed to Chair [] [] [] [x] [] [] [] [] [] [] []    Stand to Sit [] [] [] [x] [] [] [] [] [] [] []     [] [] [] [x] [] [] [] [] [] [] []    I=Independent, Mod I=Modified Independent, S=Supervision, SBA=Standby Assistance, CGA=Contact Guard Assistance,   Min=Minimal Assistance, Mod=Moderate Assistance, Max=Maximal Assistance, Total=Total Assistance, NT=Not Tested    BALANCE: Good Fair+ Fair Fair- Poor NT Comments   Sitting Static [x] [] [] [] [] []    Sitting Dynamic [x] [] [] [] [] []              Standing Static [x] [] [] [] [] []    Standing Dynamic [] [x] [] [] [] []      GAIT: I Mod I S SBA CGA Min Mod Max Total  NT x2 Comments:   Level of Assistance [] [] [] [x] [x] [] [] [] [] [] []    Distance 500 feet    DME Rolling Walker    Gait Quality Decreased toshia  and Decreased step clearance    Weightbearing Status      Stairs      I=Independent, Mod I=Modified Independent, S=Supervision, SBA=Standby Assistance, CGA=Contact Guard Assistance,   Min=Minimal Assistance, Mod=Moderate Assistance, Max=Maximal Assistance, Total=Total Assistance, NT=Not Tested    PLAN:   ACUTE PHYSICAL THERAPY GOALS:   (Developed with and agreed upon by patient and/or caregiver.)    (1.) Annemarie Cloud MCV 91 04/04/2021     04/04/2021    MCH 28 6 04/04/2021    MCHC 31 4 04/04/2021    RDW 14 2 04/04/2021    MPV 10 0 04/04/2021    NRBC 0 04/04/2021   , CMP:   Lab Results   Component Value Date    SODIUM 142 04/04/2021    K 3 9 04/04/2021     04/04/2021    CO2 24 04/04/2021    BUN 12 04/04/2021    CREATININE 0 90 04/04/2021    CALCIUM 8 9 04/04/2021     (H) 04/04/2021    ALT 77 04/04/2021    ALKPHOS 119 (H) 04/04/2021    EGFR 76 04/04/2021   , Lipase:   Lab Results   Component Value Date    LIPASE 220 04/04/2021     Imaging: I have personally reviewed pertinent reports  EKG, Pathology, and Other Studies: I have personally reviewed pertinent reports        Code Status: Level 1 - Full Code  Advance Directive and Living Will:      Power of :    POLST: will move from supine to sit and sit to supine , scoot up and down, and roll side to side with MODIFIED INDEPENDENCE within 7 treatment day(s). (2.) Jose Luis Arita will transfer from bed to chair and chair to bed with MODIFIED INDEPENDENCE using the least restrictive device within 7 treatment day(s). (3.) Jose Luis Arita will ambulate with MODIFIED INDEPENDENCE for 500 feet with the least restrictive device within 7 treatment day(s). (4.) Jose Luis Arita will perform standing static and dynamic balance activities x 25 minutes with MODIFIED INDEPENDENCE to improve safety within 7 treatment day(s). (5.) Jose Luis Arita will perform therapeutic exercises x 20 min for HEP with INDEPENDENCE to improve strength, endurance, and functional mobility within 7 treatment day(s). FREQUENCY AND DURATION: BID for duration of hospital stay or until stated goals are met, whichever comes first.    TREATMENT:   TREATMENT:   Therapeutic Activity (23 Minutes): Therapeutic activity included Supine to Sit, Lateral Scooting, Transfer Training, Ambulation on level ground, Sitting balance , Standing balance, and LE exercises to improve functional Activity tolerance, Balance, Coordination, Mobility, and Strength. TREATMENT GRID:   Date:  9/8 Date:  9/9 Date:     Activity/Exercise Parameters Parameters Parameters   Ankle pumps 1x15 1x10    Knee extensions 1x10 1x10    Knee raises 1x10 1x10    Hip abduction 1x10 1x10    Arm chair push 1x10 1x10                    AFTER TREATMENT PRECAUTIONS: Alarm Activated, Bed/Chair Locked, Call light within reach, Chair, Needs within reach, RN notified, and Visitors at bedside    INTERDISCIPLINARY COLLABORATION:  RN/ PCT, PT/ PTA, and SPTA    EDUCATION:      TIME IN/OUT:  Time In: [address]  Time Out: [address]  Minutes: [facility].  CORKY Barnard   [place], [place] What Type Of Note Output Would You Prefer (Optional)?: Bullet Format Is This A New Presentation, Or A Follow-Up?: Skin Lesion

## 2024-11-07 ENCOUNTER — OFFICE VISIT (OUTPATIENT)
Age: 74
End: 2024-11-07

## 2024-11-07 DIAGNOSIS — Z98.1 STATUS POST LUMBAR SPINAL ARTHRODESIS: Primary | ICD-10-CM

## 2024-11-07 NOTE — PROGRESS NOTES
Name: Logan Rodgers  YOB: 1950  Gender: male  MRN: [id number]  Age: 74 y.o.    Chief Complaint: Lumbar spine surgery follow up    History of Present Illness:      Logan Rodgers  is here for 7 months follow up of his  DLIF and posterior lumbar fusion surgery.  He reports significant relief of preoperative lower extremity pain, weakness and parasthesias. There is the expected residual back stiffness.  His main concern is occasional falls.  He does not feel like he has great balance during walking but he really does not have pain associated with it.     Medications:       Current Outpatient Medications:     naproxen (NAPROSYN) 500 MG tablet, Take 1 tablet by mouth 2 times daily (with meals), Disp: , Rfl:     cetirizine (ZYRTEC) 10 MG tablet, Take 1 tablet by mouth daily, Disp: , Rfl:     vitamin D 25 MCG (1000 UT) CAPS, Take by mouth daily, Disp: , Rfl:     gabapentin (NEURONTIN) 300 MG capsule, Take 1 capsule by mouth 2 times daily for 30 days. Intended supply: 30 days, Disp: 60 capsule, Rfl: 0    atorvastatin (LIPITOR) 80 MG tablet, Take 1 tablet by mouth at bedtime, Disp: , Rfl:     losartan-hydroCHLOROthiazide (HYZAAR) 100-25 MG per tablet, Take 1 tablet by mouth daily, Disp: , Rfl:     Multiple Vitamins-Minerals (MENS MULTIVITAMIN PO), Take 1 tablet by mouth daily, Disp: , Rfl:     sertraline (ZOLOFT) 100 MG tablet, Take 1 tablet by mouth daily, Disp: , Rfl:     acetaminophen (TYLENOL) 650 MG extended release tablet, Take 1 tablet by mouth as needed, Disp: , Rfl:     amLODIPine (NORVASC) 5 MG tablet, Take 1 tablet by mouth daily, Disp: , Rfl:     aspirin 81 MG EC tablet, Take 1 tablet by mouth daily preventative, Disp: , Rfl:     omeprazole (PRILOSEC) 20 MG delayed release capsule, Take 1 capsule by mouth daily, Disp: , Rfl:     Allergies:  No Known Allergies      Physical Exam:      Respirations are unlabored and there is no evidence of cyanosis      Wound is healed nicely

## 2024-12-03 ENCOUNTER — OFFICE VISIT (OUTPATIENT)
Age: 74
End: 2024-12-03

## 2024-12-03 DIAGNOSIS — G56.21 CUBITAL TUNNEL SYNDROME ON RIGHT: Primary | ICD-10-CM

## 2024-12-03 DIAGNOSIS — G56.22 CUBITAL TUNNEL SYNDROME ON LEFT: ICD-10-CM

## 2024-12-06 NOTE — PROGRESS NOTES
Orthopaedic Hand Surgery Note    Name: Logan Rodgers  YOB: 1950  Gender: male  MRN: [id number]    CC:  hand numbness      HPI: Patient is a 74 y.o. male with a chief complaint of bilateral hand numbness and tingling in the ulnar nerve distribution. The symptoms have been going on for months. The patient does complain of night wakening and increased symptoms with driving. Evaluation to date has included xrays after falling on his hands in August; the left hand is still sore. Treatment to date has included none.     ROS/Meds/PSH/PMH/FH/SH: I personally reviewed the patients standard intake form.  Pertinents are discussed In the HPI    Physical Examination:    Musculoskeletal:       Examination of the bilateral upper extremity demonstrates Decreased sensation to light touch in the ulnar distribution, normal sensation in median and radial distribution, positive carpal tunnel compression testing and Phalen testing, cap refill < 5 seconds in all fingers. Inspection reveals no thenar atrophy, no interosseous atrophy. positive Tinel and elbow flexion compression test of the cubital tunnel, negative Tinel over Guyon's canal. negative Wartenberg sign, no ulnar clawing.  No tenderness to palpation or masses noted in the forearm.    Imaging / Electrodiagnostic Tests:     I independently reviewed and interpreted left hand radiographs.  They demonstrate no acute fracture or dislocations      Assessment:     ICD-10-CM    1. Cubital tunnel syndrome on right  G56.21 Amb External Referral To Neurology      2. Cubital tunnel syndrome on left  G56.22 Amb External Referral To Neurology          Plan:  We discussed the diagnosis and different treatment options. We discussed observation, EMG/NCV, night splinting, cortisone injections and surgical decompression and the risks and benefits of all were clearly outlined.  We discussed that carpal tunnel syndrome is a progressive condition, which at its most severe

## 2024-12-09 ENCOUNTER — CLINICAL DOCUMENTATION (OUTPATIENT)
Age: 74
End: 2024-12-09

## 2024-12-13 ENCOUNTER — TELEPHONE (OUTPATIENT)
Dept: ORTHOPEDIC SURGERY | Age: 74
End: 2024-12-13

## 2024-12-13 NOTE — TELEPHONE ENCOUNTER
He called Dr. Tejada in [place] and they have not received the referral for the EMG test. Can you please resend.

## 2025-02-04 ENCOUNTER — OFFICE VISIT (OUTPATIENT)
Age: 75
End: 2025-02-04
Payer: MEDICARE

## 2025-02-04 DIAGNOSIS — G56.03 CARPAL TUNNEL SYNDROME, BILATERAL: Primary | ICD-10-CM

## 2025-02-04 PROCEDURE — 3017F COLORECTAL CA SCREEN DOC REV: CPT | Performed by: ORTHOPAEDIC SURGERY

## 2025-02-04 PROCEDURE — 1036F TOBACCO NON-USER: CPT | Performed by: ORTHOPAEDIC SURGERY

## 2025-02-04 PROCEDURE — G8417 CALC BMI ABV UP PARAM F/U: HCPCS | Performed by: ORTHOPAEDIC SURGERY

## 2025-02-04 PROCEDURE — 99214 OFFICE O/P EST MOD 30 MIN: CPT | Performed by: ORTHOPAEDIC SURGERY

## 2025-02-04 PROCEDURE — 1123F ACP DISCUSS/DSCN MKR DOCD: CPT | Performed by: ORTHOPAEDIC SURGERY

## 2025-02-04 PROCEDURE — G8428 CUR MEDS NOT DOCUMENT: HCPCS | Performed by: ORTHOPAEDIC SURGERY

## 2025-02-05 NOTE — H&P (VIEW-ONLY)
Orthopaedic Hand Surgery Note    Name: Logan Rodgers  YOB: 1950  Gender: male  MRN: [id number]    CC: Follow up of hand numbness    HPI: Patient is a 75 y.o. male who is here regarding follow up for  hand numbness and tingling. He is here to review his nerve conduction study. Today he says that right hand is worse than left, and all fingers on the right are affected.    ROS/Meds/PSH/PMH/FH/SH: I personally reviewed the patients standard intake form.  Pertinents are discussed in the HPI    Physical Examination:  Musculoskeletal:       Examination of the bilateral upper extremity demonstrates Decreased sensation to light touch in the median distribution, normal sensation in left ulnar and radial distribution, Positive carpal tunnel compression testing and Phalen testing, cap refill < 5 seconds in all fingers. Inspection reveals no thenar atrophy. equivocal Tinel and elbow flexion compression test of the cubital tunnel, negative Tinel over Guyon's canal. Sensation to light touch in the ulnar 2 digits is normal with no intrinsic atrophy/weakness. No tenderness to palpation or masses noted in the forearm.    Imaging / Electrodiagnostic Tests:     I independently reviewed and interpreted the patient's nerve conduction study. He has findings consistent with severe right and moderate left carpal tunnel syndrome    Assessment:     ICD-10-CM    1. Carpal tunnel syndrome, bilateral  G56.03           Plan:  We discussed the diagnosis and different treatment options. We discussed observation, EMG/NCV, night splinting, cortisone injections and surgical decompression and the risks and benefits of all were clearly outlined. We discussed that carpal tunnel syndrome is a progressive condition, which at its most severe stages can cause permanent sensory dysfunction, motor weakness and muscle atrophy. Many people do require surgical decompression to alleviate symptoms and prevent permanent deficits from

## 2025-02-05 NOTE — PROGRESS NOTES
Orthopaedic Hand Surgery Note    Name: Logan Rodgers  YOB: 1950  Gender: male  MRN: [id number]    CC: Follow up of hand numbness    HPI: Patient is a 75 y.o. male who is here regarding follow up for  hand numbness and tingling. He is here to review his nerve conduction study. Today he says that right hand is worse than left, and all fingers on the right are affected.    ROS/Meds/PSH/PMH/FH/SH: I personally reviewed the patients standard intake form.  Pertinents are discussed in the HPI    Physical Examination:  Musculoskeletal:       Examination of the bilateral upper extremity demonstrates Decreased sensation to light touch in the median distribution, normal sensation in left ulnar and radial distribution, Positive carpal tunnel compression testing and Phalen testing, cap refill < 5 seconds in all fingers. Inspection reveals no thenar atrophy. equivocal Tinel and elbow flexion compression test of the cubital tunnel, negative Tinel over Guyon's canal. Sensation to light touch in the ulnar 2 digits is normal with no intrinsic atrophy/weakness. No tenderness to palpation or masses noted in the forearm.    Imaging / Electrodiagnostic Tests:     I independently reviewed and interpreted the patient's nerve conduction study. He has findings consistent with severe right and moderate left carpal tunnel syndrome    Assessment:     ICD-10-CM    1. Carpal tunnel syndrome, bilateral  G56.03           Plan:  We discussed the diagnosis and different treatment options. We discussed observation, EMG/NCV, night splinting, cortisone injections and surgical decompression and the risks and benefits of all were clearly outlined. We discussed that carpal tunnel syndrome is a progressive condition, which at its most severe stages can cause permanent sensory dysfunction, motor weakness and muscle atrophy. Many people do require surgical decompression to alleviate symptoms and prevent permanent deficits from

## 2025-02-06 DIAGNOSIS — G56.01 CARPAL TUNNEL SYNDROME ON RIGHT: ICD-10-CM

## 2025-02-06 DIAGNOSIS — G56.03 CARPAL TUNNEL SYNDROME, BILATERAL: Primary | ICD-10-CM

## 2025-02-07 ENCOUNTER — TELEPHONE (OUTPATIENT)
Dept: ORTHOPEDIC SURGERY | Age: 75
End: 2025-02-07

## 2025-02-12 NOTE — PERIOP NOTE
Phone pre-assessment completed.    Verified name & . Order to obtain consent not found in EHR, however patient verifies case posting.    Type 1A surgery,  assessment complete.  Orders not received.    Labs per surgeon: unknown  Labs per anesthesia protocol: none    Patient answered medical/surgical history questions at their best of ability. All prior to admission medications documented in EPIC.    Patient instructed on the following:    Thank you for completing your phone assessment with me today. The following is a list of Pre-op instructions that you requested. Should you have any questions, please call # [phone].    Surgery Date: 25    Location: [facility]- [address] [facility] ([address]), [place] [address]. Front of building reads Outpatient. [address] is just inside the entrance on the right.   YOU WILL RECEIVE A CALL FROM A PREOP NURSE BY 4PM THE BUSINESS DAY PRIOR TO SURGERY. IF YOU HAVE NOT RECEIVED A CALL BY 4PM, YOU MAY THEN CALL THE NUMBER LISTED BELOW TO REQUEST THE ARRIVAL TIME. DO NOT CALL PRIOR TO 4PM the business day prior to your surgery date. (#[phone] MAIN Preop or Outpatient Center [phone]) If you have any questions on the day of surgery, please call the pre-op department at the telephone number listed.     No food after midnight the night before surgery.  You may have clear liquids up until 2 hours prior to arrival to avoid dehydration, and then nothing by mouth including water, ice, gum, no hard candy, no mints, no additional fluids (Nothing in mouth including tobacco)     Please take ONLY the following medications on the morning of surgery with a small sip of water:     Amlodipine (Norvasc)  Sertraline (Zoloft)  Omeprazole (Prilosec)     Please continue all regularly scheduled prescription medication the day/night prior to surgery unless otherwise instructed below.    Prescription medication to hold prior to surgery: none,however stop all

## 2025-02-18 ENCOUNTER — ANESTHESIA EVENT (OUTPATIENT)
Dept: SURGERY | Age: 75
End: 2025-02-18
Payer: MEDICARE

## 2025-02-18 RX ORDER — ONDANSETRON 2 MG/ML
4 INJECTION INTRAMUSCULAR; INTRAVENOUS
Status: CANCELLED | OUTPATIENT
Start: 2025-02-18 | End: 2025-02-19

## 2025-02-18 RX ORDER — NALOXONE HYDROCHLORIDE 0.4 MG/ML
INJECTION, SOLUTION INTRAMUSCULAR; INTRAVENOUS; SUBCUTANEOUS PRN
Status: CANCELLED | OUTPATIENT
Start: 2025-02-18

## 2025-02-18 RX ORDER — PROCHLORPERAZINE EDISYLATE 5 MG/ML
5 INJECTION INTRAMUSCULAR; INTRAVENOUS
Status: CANCELLED | OUTPATIENT
Start: 2025-02-18 | End: 2025-02-19

## 2025-02-18 RX ORDER — OXYCODONE HYDROCHLORIDE 5 MG/1
5 TABLET ORAL
Status: CANCELLED | OUTPATIENT
Start: 2025-02-18 | End: 2025-02-19

## 2025-02-18 NOTE — PERIOP NOTE
Preop department called to notify patient of arrival time for scheduled procedure. Instructions given to   - Arrive at [facility] Entrance [address].  - Nothing to eat or drink after midnight unless otherwise indicated. No gum, mints, or ice chips. You may have a small sip of water with medications you were instructed to take.   - Have a responsible adult to drive patient to the hospital, stay during surgery, and patient will need supervision 24 hours after anesthesia.   - Use antibacterial soap in shower the night before surgery and on the morning of surgery.       Was patient contacted: yes-pt   Voicemail left:   Numbers contacted: [phone]   Arrival time: 0630

## 2025-02-19 ENCOUNTER — HOSPITAL ENCOUNTER (OUTPATIENT)
Age: 75
Setting detail: OUTPATIENT SURGERY
Discharge: HOME | End: 2025-02-19
Attending: ORTHOPAEDIC SURGERY | Admitting: ORTHOPAEDIC SURGERY
Payer: MEDICARE

## 2025-02-19 ENCOUNTER — ANESTHESIA (OUTPATIENT)
Dept: SURGERY | Age: 75
End: 2025-02-19
Payer: MEDICARE

## 2025-02-19 VITALS
TEMPERATURE: 98 F | RESPIRATION RATE: 15 BRPM | OXYGEN SATURATION: 96 % | WEIGHT: 205 LBS | HEIGHT: 67 IN | HEART RATE: 58 BPM | BODY MASS INDEX: 32.18 KG/M2 | DIASTOLIC BLOOD PRESSURE: 70 MMHG | SYSTOLIC BLOOD PRESSURE: 115 MMHG

## 2025-02-19 DIAGNOSIS — G56.01 CARPAL TUNNEL SYNDROME ON RIGHT: ICD-10-CM

## 2025-02-19 LAB — POTASSIUM BLD-SCNC: 3.6 MMOL/L (ref 3.5–5.1)

## 2025-02-19 PROCEDURE — 6360000002 HC RX W HCPCS: Performed by: STUDENT IN AN ORGANIZED HEALTH CARE EDUCATION/TRAINING PROGRAM

## 2025-02-19 PROCEDURE — 84132 ASSAY OF SERUM POTASSIUM: CPT

## 2025-02-19 PROCEDURE — 3700000000 HC ANESTHESIA ATTENDED CARE: Performed by: ORTHOPAEDIC SURGERY

## 2025-02-19 PROCEDURE — 7100000010 HC PHASE II RECOVERY - FIRST 15 MIN: Performed by: ORTHOPAEDIC SURGERY

## 2025-02-19 PROCEDURE — 2580000003 HC RX 258: Performed by: STUDENT IN AN ORGANIZED HEALTH CARE EDUCATION/TRAINING PROGRAM

## 2025-02-19 PROCEDURE — 88305 TISSUE EXAM BY PATHOLOGIST: CPT

## 2025-02-19 PROCEDURE — 6360000002 HC RX W HCPCS: Performed by: ORTHOPAEDIC SURGERY

## 2025-02-19 PROCEDURE — 2709999900 HC NON-CHARGEABLE SUPPLY: Performed by: ORTHOPAEDIC SURGERY

## 2025-02-19 PROCEDURE — 3600000002 HC SURGERY LEVEL 2 BASE: Performed by: ORTHOPAEDIC SURGERY

## 2025-02-19 PROCEDURE — 3700000001 HC ADD 15 MINUTES (ANESTHESIA): Performed by: ORTHOPAEDIC SURGERY

## 2025-02-19 PROCEDURE — 88313 SPECIAL STAINS GROUP 2: CPT

## 2025-02-19 PROCEDURE — 3600000012 HC SURGERY LEVEL 2 ADDTL 15MIN: Performed by: ORTHOPAEDIC SURGERY

## 2025-02-19 PROCEDURE — 7100000000 HC PACU RECOVERY - FIRST 15 MIN: Performed by: ORTHOPAEDIC SURGERY

## 2025-02-19 PROCEDURE — 7100000001 HC PACU RECOVERY - ADDTL 15 MIN: Performed by: ORTHOPAEDIC SURGERY

## 2025-02-19 PROCEDURE — 25115 REMOVE WRIST/FOREARM LESION: CPT | Performed by: ORTHOPAEDIC SURGERY

## 2025-02-19 PROCEDURE — 6360000002 HC RX W HCPCS: Performed by: NURSE ANESTHETIST, CERTIFIED REGISTERED

## 2025-02-19 RX ORDER — SODIUM CHLORIDE 0.9 % (FLUSH) 0.9 %
5-40 SYRINGE (ML) INJECTION EVERY 12 HOURS SCHEDULED
Status: DISCONTINUED | OUTPATIENT
Start: 2025-02-19 | End: 2025-02-19 | Stop reason: HOSPADM

## 2025-02-19 RX ORDER — SODIUM CHLORIDE 9 MG/ML
INJECTION, SOLUTION INTRAVENOUS PRN
Status: DISCONTINUED | OUTPATIENT
Start: 2025-02-19 | End: 2025-02-19 | Stop reason: HOSPADM

## 2025-02-19 RX ORDER — BUPIVACAINE HYDROCHLORIDE 2.5 MG/ML
INJECTION, SOLUTION EPIDURAL; INFILTRATION; INTRACAUDAL PRN
Status: DISCONTINUED | OUTPATIENT
Start: 2025-02-19 | End: 2025-02-19 | Stop reason: HOSPADM

## 2025-02-19 RX ORDER — LIDOCAINE HYDROCHLORIDE 10 MG/ML
1 INJECTION, SOLUTION INFILTRATION; PERINEURAL
Status: DISCONTINUED | OUTPATIENT
Start: 2025-02-19 | End: 2025-02-19 | Stop reason: HOSPADM

## 2025-02-19 RX ORDER — SODIUM CHLORIDE 0.9 % (FLUSH) 0.9 %
5-40 SYRINGE (ML) INJECTION PRN
Status: DISCONTINUED | OUTPATIENT
Start: 2025-02-19 | End: 2025-02-19 | Stop reason: HOSPADM

## 2025-02-19 RX ORDER — MIDAZOLAM HYDROCHLORIDE 2 MG/2ML
2 INJECTION, SOLUTION INTRAMUSCULAR; INTRAVENOUS
Status: DISCONTINUED | OUTPATIENT
Start: 2025-02-19 | End: 2025-02-19 | Stop reason: HOSPADM

## 2025-02-19 RX ORDER — LIDOCAINE HYDROCHLORIDE 10 MG/ML
INJECTION, SOLUTION INFILTRATION; PERINEURAL PRN
Status: DISCONTINUED | OUTPATIENT
Start: 2025-02-19 | End: 2025-02-19 | Stop reason: HOSPADM

## 2025-02-19 RX ORDER — HYDROCODONE BITARTRATE AND ACETAMINOPHEN 5; 325 MG/1; MG/1
1 TABLET ORAL EVERY 6 HOURS PRN
Qty: 12 TABLET | Refills: 0 | Status: SHIPPED | OUTPATIENT
Start: 2025-02-19 | End: 2025-02-22

## 2025-02-19 RX ORDER — LIDOCAINE HYDROCHLORIDE 20 MG/ML
INJECTION, SOLUTION EPIDURAL; INFILTRATION; INTRACAUDAL; PERINEURAL
Status: DISCONTINUED | OUTPATIENT
Start: 2025-02-19 | End: 2025-02-19 | Stop reason: SDUPTHER

## 2025-02-19 RX ORDER — LIDOCAINE HYDROCHLORIDE 5 MG/ML
INJECTION, SOLUTION INFILTRATION; INTRAVENOUS
Status: COMPLETED | OUTPATIENT
Start: 2025-02-19 | End: 2025-02-19

## 2025-02-19 RX ORDER — SODIUM CHLORIDE, SODIUM LACTATE, POTASSIUM CHLORIDE, CALCIUM CHLORIDE 600; 310; 30; 20 MG/100ML; MG/100ML; MG/100ML; MG/100ML
INJECTION, SOLUTION INTRAVENOUS CONTINUOUS
Status: DISCONTINUED | OUTPATIENT
Start: 2025-02-19 | End: 2025-02-19 | Stop reason: HOSPADM

## 2025-02-19 RX ORDER — PROPOFOL 10 MG/ML
INJECTION, EMULSION INTRAVENOUS
Status: DISCONTINUED | OUTPATIENT
Start: 2025-02-19 | End: 2025-02-19 | Stop reason: SDUPTHER

## 2025-02-19 RX ORDER — FENTANYL CITRATE 50 UG/ML
100 INJECTION, SOLUTION INTRAMUSCULAR; INTRAVENOUS
Status: DISCONTINUED | OUTPATIENT
Start: 2025-02-19 | End: 2025-02-19 | Stop reason: HOSPADM

## 2025-02-19 RX ADMIN — LIDOCAINE HYDROCHLORIDE 20 MG: 20 INJECTION, SOLUTION EPIDURAL; INFILTRATION; INTRACAUDAL; PERINEURAL at 08:05

## 2025-02-19 RX ADMIN — PROPOFOL 20 MG: 10 INJECTION, EMULSION INTRAVENOUS at 08:24

## 2025-02-19 RX ADMIN — LIDOCAINE HYDROCHLORIDE 50 ML: 5 INJECTION, SOLUTION INFILTRATION at 08:03

## 2025-02-19 RX ADMIN — Medication 2000 MG: at 08:10

## 2025-02-19 RX ADMIN — SODIUM CHLORIDE, POTASSIUM CHLORIDE, SODIUM LACTATE AND CALCIUM CHLORIDE: 600; 310; 30; 20 INJECTION, SOLUTION INTRAVENOUS at 07:54

## 2025-02-19 RX ADMIN — PROPOFOL 20 MG: 10 INJECTION, EMULSION INTRAVENOUS at 08:16

## 2025-02-19 RX ADMIN — PROPOFOL 80 MCG/KG/MIN: 10 INJECTION, EMULSION INTRAVENOUS at 08:05

## 2025-02-19 RX ADMIN — PROPOFOL 20 MG: 10 INJECTION, EMULSION INTRAVENOUS at 08:20

## 2025-02-19 ASSESSMENT — PAIN SCALES - GENERAL
PAINLEVEL_OUTOF10: 0
PAINLEVEL_OUTOF10: 0

## 2025-02-19 ASSESSMENT — PAIN - FUNCTIONAL ASSESSMENT: PAIN_FUNCTIONAL_ASSESSMENT: 0-10

## 2025-02-19 NOTE — ANESTHESIA PRE PROCEDURE
Department of Anesthesiology  Preprocedure Note       Name:  Logan Rodgers   Age:  75 y.o.  :  1950                                          MRN:  [id number]         Date:  2025      Surgeon: Surgeon(s):  Monica, Christiane BOWEN MD    Procedure: Procedure(s):  Right revision CARPAL TUNNEL RELEASE    Medications prior to admission:   Prior to Admission medications    Medication Sig Start Date End Date Taking? Authorizing Provider   naproxen (NAPROSYN) 500 MG tablet Take 1 tablet by mouth 2 times daily (with meals)   Yes Carrie Chester MD   cetirizine (ZYRTEC) 10 MG tablet Take 1 tablet by mouth daily as needed for Allergies (seasonal allergies)   Yes Carrie Chester MD   vitamin D 25 MCG (1000 UT) CAPS Take by mouth daily   Yes Carrie Chester MD   atorvastatin (LIPITOR) 80 MG tablet Take 1 tablet by mouth at bedtime   Yes Carrie Chester MD   losartan-hydroCHLOROthiazide (HYZAAR) 100-25 MG per tablet Take 1 tablet by mouth daily   Yes Carrie Chester MD   sertraline (ZOLOFT) 100 MG tablet Take 1 tablet by mouth daily   Yes Carrie Chester MD   acetaminophen (TYLENOL) 650 MG extended release tablet Take 1 tablet by mouth as needed   Yes Automatic Reconciliation, Ar   amLODIPine (NORVASC) 5 MG tablet Take 1 tablet by mouth daily   Yes Automatic Reconciliation, Ar   aspirin 81 MG EC tablet Take 1 tablet by mouth every other day preventative   Yes Automatic Reconciliation, Ar   omeprazole (PRILOSEC) 20 MG delayed release capsule Take 1 capsule by mouth daily   Yes Automatic Reconciliation, Ar   Multiple Vitamins-Minerals (MENS MULTIVITAMIN PO) Take 1 tablet by mouth daily    Carrie Chester MD   diclofenac (VOLTAREN) 75 MG EC tablet Take 75 mg by mouth daily  22  Automatic Reconciliation, Ar       Current medications:    Current Facility-Administered Medications   Medication Dose Route Frequency Provider Last Rate Last Admin    ceFAZolin (ANCEF) 2000 mg in

## 2025-02-19 NOTE — PROGRESS NOTES
Spiritual Health History and Assessment/Progress Note  [facility]    Pre-Op,  ,  ,      Name: Logan Rodgers MRN: [id number]    Age: 75 y.o.     Sex: male   Language: English   Buddhism: Cheondoism   Carpal tunnel syndrome on right     Date: 2/19/2025            Total Time Calculated: 16 min              Spiritual Assessment began in [facility]        Referral/Consult From: Patient, Nurse   Encounter Overview/Reason: Pre-Op  Service Provided For: Patient    Marjan, Belief, Meaning:   Patient identifies as spiritual, is connected with a marjan tradition or spiritual practice, and has beliefs or practices that help with coping during difficult times  Family/Friends No family/friends present      Importance and Influence:  Patient has spiritual/personal beliefs that influence decisions regarding their health  Family/Friends No family/friends present    Community:  Patient is connected with a spiritual community  Family/Friends No family/friends present    Assessment and Plan of Care:     Patient Interventions include: Facilitated expression of thoughts and feelings, Explored spiritual coping/struggle/distress, and Affirmed coping skills/support systems  Family/Friends Interventions include: No family/friends present    Patient Plan of Care: Spiritual Care available upon further referral  Family/Friends Plan of Care: Spiritual Care available upon further referral    Electronically signed by IDANIA OLIVEROS on 2/19/2025 at 9:54 AM     Pre-Surgery Prayer. Patient was in bed preparing for surgery. Patient expressed importance of and comfort in marjan and prayer. Patient is Cheondoism.  offered prayer.  offered additional support if needed.    Rev. REDD OliverosDiv.

## 2025-02-19 NOTE — ANESTHESIA POSTPROCEDURE EVALUATION
Department of Anesthesiology  Postprocedure Note    Patient: Logan Rodgers  MRN: [id number]  YOB: 1950  Date of evaluation: 2/19/2025    Procedure Summary       Date: 02/19/25 Room / Location: [facility] OP OR 06 / [facility]    Anesthesia Start: 0750 Anesthesia Stop: 0839    Procedure: Right revision CARPAL TUNNEL RELEASE (Right: Hand) Diagnosis:       Carpal tunnel syndrome on right      (Carpal tunnel syndrome on right [G56.01])    Surgeons: Christiane Anderson MD Responsible Provider: Cipriano Peter MD    Anesthesia Type: [place] block ASA Status: 2            Anesthesia Type: No value filed.    Kady Phase I: Kady Score: 10    Kady Phase II: Kady Score: 10    Anesthesia Post Evaluation    Patient location during evaluation: PACU  Patient participation: complete - patient participated  Level of consciousness: awake  Airway patency: patent  Nausea & Vomiting: no nausea and no vomiting  Cardiovascular status: blood pressure returned to baseline  Respiratory status: acceptable  Hydration status: euvolemic  Comments: -------------------------              02/19/25                    0900        -------------------------   BP:         115/70        Pulse:        58          Resp:         15          Temp:   98 °F (36.7 °C)   SpO2:         96%        -------------------------    Pain management: adequate    No notable events documented.

## 2025-02-19 NOTE — OP NOTE
Hand Surgery Operative Note      Logan Rodgers   75 y.o.   male      Pre-op diagnosis: Right recurrent Carpal Tunnel syndrome  Post op diagnosis: same      Procedure: Right revision Carpal Tunnel release, extensive flexor synovectomy and biopsy (48210 mod 22, 25035)     Surgeon: Christiane Anderson MD     Anesthesia: [place] block      Tourniquet time:   Total Tourniquet Time Documented:  Arm  (Right) - 18 minutes  Total: Arm  (Right) - 18 minutes        Procedure indications: Patient with recurrent carpal tunnel symptoms after a significant symptom-free period following surgery, symptoms are recalcitrant to conservative measures with positive documentation of NCV findings consistent with carpal tunnel syndrome. After Thorough discussion, the patient decided to proceed with surgical management. We discussed in detail surgical risks including scar, pain, bleeding, infection, anesthetic risks, neurovascular injury, need for further surgery,  weakness, stiffness, risk of death and potential risk of other unforseen complication.      Procedure description:      Patient was placed in the supine position and after appropriate time-out and side, site and procedure confirmed.     An extesile incision was made in the palm in line with the radial border of the ring finger under loupe magnification and palmar fascia was incised longitudinally.  Blunt retraction used to identify the transverse carpal ligament, which was incised, visualizing the median nerve beneath, which was protected with a slotted freer. The remaining ligament was released with a [place] meniscal blade under direct visualization.  The ligament was released in its entirety, and visualized at its most proximal and distal extent. The nerve was carefully dissected,  this was a very meticulous and slow part of the procedure which required significant effort and double the time of the operation.  After the nerve was fully released from the scar tissue adhered to  the transverse carpal ligament it was retracted radially and extensive flexor synovectomy was performed with sharp scissors on each flexor tendon sheath until all tendons exhibited and smooth gliding surface, significant synovitis was found.  All resected material was sent for biopsy and amyloid staining.      Wound was irrigated, tourniquet released and hemostasis was obtained with bipolar cautery. Skin edges were infiltrated with .25% Bupivacaine.  Wound then closed with 4-0 nylon and sterile dressing applied.       Disposition: To PACU with no complications and follow up per routine.  Patient is instructed to remove dressings in five days and other precautions include avoidance of heavy and repetitive lifting for 2 weeks, when an appointment for follow up and suture removal will take place.     Christiane Anderson MD  02/19/25  8:39 AM

## 2025-02-19 NOTE — ANESTHESIA PROCEDURE NOTES
Peripheral Block    Patient location during procedure: OR  Reason for block: procedure for pain, primary anesthetic and at surgeon's request  Start time: 2/19/2025 8:00 AM  End time: 2/19/2025 8:03 AM  Staffing  Performed: resident/CRNA   Resident/CRNA: Isidra Hinds APRN - CRNA  Performed by: Isidra Hinds APRN - CRNA  Authorized by: Cipriano Peter MD    Preanesthetic Checklist  Completed: patient identified, IV checked, site marked, risks and benefits discussed, surgical/procedural consents, equipment checked, pre-op evaluation, timeout performed, anesthesia consent given, oxygen available, monitors applied/VS acknowledged, fire risk safety assessment completed and verbalized and blood product R/B/A discussed and consented  Peripheral Block   Patient position: supine  Block type: [place] block  Laterality: right  Injection technique: single-shot  Guidance: other  Local infiltration: lidocaine  Infiltration strength: 0.5 %  Local infiltration: lidocaine  Dose: 50 mL      Medications Administered  lidocaine injection 0.5% - Perineural   50 mL - 2/19/2025 8:03:00 AM

## 2025-03-04 ENCOUNTER — OFFICE VISIT (OUTPATIENT)
Age: 75
End: 2025-03-04

## 2025-03-04 DIAGNOSIS — G56.03 CARPAL TUNNEL SYNDROME, BILATERAL: Primary | ICD-10-CM

## 2025-03-04 PROCEDURE — 99024 POSTOP FOLLOW-UP VISIT: CPT | Performed by: ORTHOPAEDIC SURGERY

## 2025-03-05 NOTE — PROGRESS NOTES
Orthopaedic Hand Surgery Note    Name: Logan Rodgers  YOB: 1950  Gender: male  MRN: [id number]    HPI: Patient is status post Right revision CARPAL TUNNEL RELEASE - Right on 2/19/2025. Patient reports sensation is improved. Night symptoms have resolved. No fevers or chills.    Physical Examination:  Wound healing well. Good finger and wrist range of motion. Sensation is improved from preoperative.    Assessment:   1. Carpal tunnel syndrome, bilateral        Status post Right revision CARPAL TUNNEL RELEASE - Right on 2/19/2025    Plan:  We discussed that the hand can get wet uncovered in the shower but no soaking the hand for 2 more weeks. After washing the hand the incision needs to be patted dry and covered. Lifting will be kept at 2 lbs and under with avoidance of undue stress on the hand for 2 weeks. Patient will see me in 4 weeks for reevaluation.    Christiane Anderson MD  Orthopaedic Surgery  03/04/25  7:11 PM

## 2025-08-28 ENCOUNTER — OFFICE VISIT (OUTPATIENT)
Age: 75
End: 2025-08-28
Payer: MEDICARE

## 2025-08-28 DIAGNOSIS — S56.911A STRAIN OF RIGHT FOREARM, INITIAL ENCOUNTER: Primary | ICD-10-CM

## 2025-08-28 PROCEDURE — 99213 OFFICE O/P EST LOW 20 MIN: CPT | Performed by: ORTHOPAEDIC SURGERY

## 2025-08-28 PROCEDURE — 1036F TOBACCO NON-USER: CPT | Performed by: ORTHOPAEDIC SURGERY

## 2025-08-28 PROCEDURE — 1123F ACP DISCUSS/DSCN MKR DOCD: CPT | Performed by: ORTHOPAEDIC SURGERY

## 2025-08-28 PROCEDURE — 3017F COLORECTAL CA SCREEN DOC REV: CPT | Performed by: ORTHOPAEDIC SURGERY

## 2025-08-28 PROCEDURE — G8417 CALC BMI ABV UP PARAM F/U: HCPCS | Performed by: ORTHOPAEDIC SURGERY

## 2025-08-28 PROCEDURE — G8428 CUR MEDS NOT DOCUMENT: HCPCS | Performed by: ORTHOPAEDIC SURGERY

## (undated) DEVICE — KNIFE SURG DISCECTOMY 190 MM BAYNT

## (undated) DEVICE — SOLUTION IRRIG 1000ML 0.9% SOD CHL USP POUR PLAS BTL

## (undated) DEVICE — DRESSING,GAUZE,XEROFORM,CURAD,1"X8",ST: Brand: CURAD

## (undated) DEVICE — HAND PACK: Brand: MEDLINE INDUSTRIES, INC.

## (undated) DEVICE — ZIMMER® STERILE DISPOSABLE TOURNIQUET CUFF WITH PLC, DUAL PORT, SINGLE BLADDER, 18 IN. (46 CM)

## (undated) DEVICE — SUTURE MONOCRYL SZ 4-0 L27IN ABSRB UD L19MM PS-2 1/2 CIR PRIM Y426H

## (undated) DEVICE — CORD ES L12FT BPLR FRCP

## (undated) DEVICE — RUBBERBAND FASTENING W0.25XL3.5IN 5 PER PK

## (undated) DEVICE — DISPOSABLE BIPOLAR CODE, 12' (3.66 M): Brand: CONMED

## (undated) DEVICE — BIPOLAR SEALER 23-113-1 AQM 2.3 OM NEURO: Brand: AQUAMANTYS ®

## (undated) DEVICE — SUTURE VICRYL SZ 1 L27IN ABSRB UD L36MM CP-1 1/2 CIR REV CUT J268H

## (undated) DEVICE — TRAY,URINE METER,100% SILICONE,16FR10ML: Brand: MEDLINE

## (undated) DEVICE — FORCEPS BPLR L210MM TIP L1.5 WRK L105MM STR BAYNT INSUL DISP

## (undated) DEVICE — PENCIL ES L3M BTTN SWCH HOLSTER W/ BLDE ELECTRD EDGE

## (undated) DEVICE — RUBBERBAND FASTENING W1/8XL3IN 2 PER PK

## (undated) DEVICE — SUTURE VCRL SZ 1 L27IN ABSRB UD L36MM CP-1 1/2 CIR REV CUT J268H

## (undated) DEVICE — ABSORBENT, WATERPROOF, BACTERIA PROOF FILM DRESSING: Brand: OPSITE POST OP 20X10CM CTN 20

## (undated) DEVICE — GOWN,ECLIPSE,NONRNF,XL,ST,30/CS: Brand: MEDLINE

## (undated) DEVICE — SOLUTION IRRIG 1000ML 09% SOD CHL USP PIC PLAS CONTAINER

## (undated) DEVICE — RESERVOIR,SUCTION,100CC,SILICONE: Brand: MEDLINE

## (undated) DEVICE — GUIDEWIRE ORTH BVL DISP
Type: IMPLANTABLE DEVICE | Site: SPINE LUMBAR | Status: NON-FUNCTIONAL
Removed: 2024-04-09

## (undated) DEVICE — TUBING, SUCTION, 1/4" X 10', STRAIGHT: Brand: MEDLINE

## (undated) DEVICE — NEUROMONITORING PROBE: Brand: AVS ARIA

## (undated) DEVICE — STERILE PACKAGE WITH CANNULA: Brand: LITE BIO DELIVERY SYSTEM

## (undated) DEVICE — DRAPE MICSCP W51XL150IN FOR LEICA M680 WILD OHS

## (undated) DEVICE — CARDINAL HEALTH FLEXIBLE LIGHT HANDLE COVER: Brand: CARDINAL HEALTH

## (undated) DEVICE — POSTERIOR LAMI VANPLT-LUCAS: Brand: MEDLINE INDUSTRIES, INC.

## (undated) DEVICE — GAUZE,SPONGE,8"X4",12PLY,XRAY,STRL,LF: Brand: MEDLINE

## (undated) DEVICE — SPONGE GZ W4XL4IN COT 12 PLY TYP VII WVN C FLD DSGN STERILE

## (undated) DEVICE — DRAPE,U/SHT,SPLIT,FILM,60X84,STERILE: Brand: MEDLINE

## (undated) DEVICE — SUTURE MONOCRYL SZ 2-0 L27IN ABSRB UD CP-1 1 L36MM 1/2 CIR REV Y266H

## (undated) DEVICE — PADDING CAST W3INXL4YD COT BLEND MIC PLEAT UNDERCAST SPEC

## (undated) DEVICE — SYRINGE IRRIG 60ML SFT PLIABLE BLB EZ TO GRP 1 HND USE W/

## (undated) DEVICE — BANDAGE COMPR L5YDXW2IN FOAM CO FLX

## (undated) DEVICE — GLOVE SURG SZ 65 THK91MIL LTX FREE SYN POLYISOPRENE

## (undated) DEVICE — SUTURE VCRL SZ 2-0 L27IN ABSRB UD L36MM CP-1 1/2 CIR REV J266H

## (undated) DEVICE — KIT ARMOR C DRP COLLAPSIBLE AND SELF EXP TOP CVR FOR FLUOROSCOPIC

## (undated) DEVICE — UNIVERSAL DRAPES: Brand: MEDLINE INDUSTRIES, INC.

## (undated) DEVICE — DRAPE TWL SURG 16X26IN BLU ORB04] ALLCARE INC]

## (undated) DEVICE — BIPOLAR SEALER 23-112-1 AQM 6.0: Brand: AQUAMANTYS ®

## (undated) DEVICE — DRAIN SURG 10FR L1/8IN RND CHN FULL FLUT HEAT POLISHED PERF

## (undated) DEVICE — GLOVE SURG SZ 65 L12IN FNGR THK79MIL GRN LTX FREE

## (undated) DEVICE — PROBE DILATOR 3: Brand: AVS ARIA

## (undated) DEVICE — DRILL BIT: Brand: XIA 4.5 SYSTEM -  XIA CT

## (undated) DEVICE — MODULAR TAP: Brand: XIA 4.5 SYSTEM -  XIA CT

## (undated) DEVICE — DISPOSABLE DRAPE, STERILE, FOR A CDS-3060 5 FOOT TABLE: Brand: PEDIGO PRODUCTS, INC.

## (undated) DEVICE — ABSORBENT, WATERPROOF, BACTERIA PROOF FILM DRESSING: Brand: OPSITE POST OP 9.5X8.5CM CTN 20

## (undated) DEVICE — PROBE DILATOR 2: Brand: AVS ARIA

## (undated) DEVICE — AGENT HEMOSTATIC SURGIFLOW MATRIX KIT W/THROMBIN

## (undated) DEVICE — SUTURE VCRL + 3-0 L27IN ABSRB UD PS-2 L19MM 3/8 CIR PRIM VCP427H

## (undated) DEVICE — 3M™ STERI-DRAPE™ INSTRUMENT POUCH 1018: Brand: STERI-DRAPE™

## (undated) DEVICE — ADHESIVE SKIN CLOSURE WND 8.661X1.5 IN 22 CM LIQUIBAND SECUR

## (undated) DEVICE — DILATOR 1: Brand: AVS ARIA

## (undated) DEVICE — 3M™ IOBAN™ 2 ANTIMICROBIAL INCISE DRAPE 6650EZ: Brand: IOBAN™ 2

## (undated) DEVICE — AGENT HEMSTAT 8ML FLX TIP MTRX + DISP SURGIFLO